# Patient Record
Sex: FEMALE | Race: WHITE | Employment: UNEMPLOYED | URBAN - METROPOLITAN AREA
[De-identification: names, ages, dates, MRNs, and addresses within clinical notes are randomized per-mention and may not be internally consistent; named-entity substitution may affect disease eponyms.]

---

## 2019-02-12 ENCOUNTER — HOSPITAL ENCOUNTER (OUTPATIENT)
Dept: MAMMOGRAPHY | Age: 44
Discharge: HOME | End: 2019-02-12
Payer: COMMERCIAL

## 2019-02-12 DIAGNOSIS — Z12.31 ENCOUNTER FOR SCREENING MAMMOGRAM FOR BREAST CANCER: ICD-10-CM

## 2019-02-12 PROCEDURE — 77063 BREAST TOMOSYNTHESIS BI: CPT

## 2019-05-28 ENCOUNTER — HOSPITAL ENCOUNTER (EMERGENCY)
Age: 44
Discharge: HOME | End: 2019-05-28
Attending: EMERGENCY MEDICINE
Payer: COMMERCIAL

## 2019-05-28 ENCOUNTER — APPOINTMENT (OUTPATIENT)
Dept: GENERAL RADIOLOGY | Age: 44
End: 2019-05-28
Payer: COMMERCIAL

## 2019-05-28 VITALS
RESPIRATION RATE: 18 BRPM | HEIGHT: 66 IN | OXYGEN SATURATION: 100 % | TEMPERATURE: 99.1 F | BODY MASS INDEX: 40.18 KG/M2 | HEART RATE: 78 BPM | WEIGHT: 250 LBS | DIASTOLIC BLOOD PRESSURE: 74 MMHG | SYSTOLIC BLOOD PRESSURE: 116 MMHG

## 2019-05-28 DIAGNOSIS — R06.02 SHORTNESS OF BREATH: Primary | ICD-10-CM

## 2019-05-28 LAB
A/G RATIO: 1.2 (ref 1.1–2.2)
ALBUMIN SERPL-MCNC: 3.7 G/DL (ref 3.4–5)
ALP BLD-CCNC: 83 U/L (ref 40–129)
ALT SERPL-CCNC: 14 U/L (ref 10–40)
ANION GAP SERPL CALCULATED.3IONS-SCNC: 11 MMOL/L (ref 3–16)
AST SERPL-CCNC: 23 U/L (ref 15–37)
BASOPHILS ABSOLUTE: 0 K/UL (ref 0–0.2)
BASOPHILS RELATIVE PERCENT: 0.5 %
BILIRUB SERPL-MCNC: 0.3 MG/DL (ref 0–1)
BILIRUBIN URINE: NEGATIVE
BLOOD, URINE: NEGATIVE
BUN BLDV-MCNC: 20 MG/DL (ref 7–20)
CALCIUM SERPL-MCNC: 8.8 MG/DL (ref 8.3–10.6)
CHLORIDE BLD-SCNC: 104 MMOL/L (ref 99–110)
CLARITY: CLEAR
CO2: 23 MMOL/L (ref 21–32)
COLOR: YELLOW
CREAT SERPL-MCNC: 0.6 MG/DL (ref 0.6–1.1)
D DIMER: <200 NG/ML DDU (ref 0–229)
EOSINOPHILS ABSOLUTE: 0.2 K/UL (ref 0–0.6)
EOSINOPHILS RELATIVE PERCENT: 2.6 %
GFR AFRICAN AMERICAN: >60
GFR NON-AFRICAN AMERICAN: >60
GLOBULIN: 3 G/DL
GLUCOSE BLD-MCNC: 98 MG/DL (ref 70–99)
GLUCOSE URINE: NEGATIVE MG/DL
HCT VFR BLD CALC: 43.4 % (ref 36–48)
HEMOGLOBIN: 14.5 G/DL (ref 12–16)
KETONES, URINE: NEGATIVE MG/DL
LEUKOCYTE ESTERASE, URINE: NEGATIVE
LYMPHOCYTES ABSOLUTE: 2.8 K/UL (ref 1–5.1)
LYMPHOCYTES RELATIVE PERCENT: 32.1 %
MCH RBC QN AUTO: 31.5 PG (ref 26–34)
MCHC RBC AUTO-ENTMCNC: 33.4 G/DL (ref 31–36)
MCV RBC AUTO: 94.2 FL (ref 80–100)
MICROSCOPIC EXAMINATION: NORMAL
MONOCYTES ABSOLUTE: 0.7 K/UL (ref 0–1.3)
MONOCYTES RELATIVE PERCENT: 8.4 %
NEUTROPHILS ABSOLUTE: 5 K/UL (ref 1.7–7.7)
NEUTROPHILS RELATIVE PERCENT: 56.4 %
NITRITE, URINE: NEGATIVE
PDW BLD-RTO: 13.1 % (ref 12.4–15.4)
PH UA: 7.5 (ref 5–8)
PLATELET # BLD: 310 K/UL (ref 135–450)
PMV BLD AUTO: 7.7 FL (ref 5–10.5)
POTASSIUM REFLEX MAGNESIUM: 4.8 MMOL/L (ref 3.5–5.1)
PROTEIN UA: NEGATIVE MG/DL
RBC # BLD: 4.61 M/UL (ref 4–5.2)
SODIUM BLD-SCNC: 138 MMOL/L (ref 136–145)
SPECIFIC GRAVITY UA: 1.01 (ref 1–1.03)
TOTAL PROTEIN: 6.7 G/DL (ref 6.4–8.2)
TROPONIN: <0.01 NG/ML
URINE TYPE: NORMAL
UROBILINOGEN, URINE: 1 E.U./DL
WBC # BLD: 8.8 K/UL (ref 4–11)

## 2019-05-28 PROCEDURE — 85379 FIBRIN DEGRADATION QUANT: CPT

## 2019-05-28 PROCEDURE — 84484 ASSAY OF TROPONIN QUANT: CPT

## 2019-05-28 PROCEDURE — 81003 URINALYSIS AUTO W/O SCOPE: CPT

## 2019-05-28 PROCEDURE — 85025 COMPLETE CBC W/AUTO DIFF WBC: CPT

## 2019-05-28 PROCEDURE — 80053 COMPREHEN METABOLIC PANEL: CPT

## 2019-05-28 PROCEDURE — 99285 EMERGENCY DEPT VISIT HI MDM: CPT

## 2019-05-28 PROCEDURE — 71046 X-RAY EXAM CHEST 2 VIEWS: CPT

## 2019-05-28 PROCEDURE — 93005 ELECTROCARDIOGRAM TRACING: CPT | Performed by: PHYSICIAN ASSISTANT

## 2019-05-28 ASSESSMENT — ENCOUNTER SYMPTOMS
SHORTNESS OF BREATH: 1
GASTROINTESTINAL NEGATIVE: 1

## 2019-05-29 LAB
EKG ATRIAL RATE: 68 BPM
EKG DIAGNOSIS: NORMAL
EKG P AXIS: 13 DEGREES
EKG P-R INTERVAL: 126 MS
EKG Q-T INTERVAL: 388 MS
EKG QRS DURATION: 78 MS
EKG QTC CALCULATION (BAZETT): 412 MS
EKG R AXIS: 26 DEGREES
EKG T AXIS: 10 DEGREES
EKG VENTRICULAR RATE: 68 BPM

## 2019-05-29 PROCEDURE — 93010 ELECTROCARDIOGRAM REPORT: CPT | Performed by: INTERNAL MEDICINE

## 2019-05-29 NOTE — ED NOTES
Discharge instructions reviewed. Patient expressed understanding.  IV removed from right hand, tip intact, gauze pressure dressing applied     Alina Daniels RN  05/28/19 0851

## 2019-05-29 NOTE — ED NOTES
Patient ambulated approx 100 feet. spo2 remained 96% throughtout ambulation.  Patient on RA during ambulation     John Smith RN  05/28/19 7110

## 2019-05-29 NOTE — ED PROVIDER NOTES
**[facility]  EMERGENCY [facility]PARTMENT ENCOUNTER      Pt Name: Liam Rizo  QNY:[id number]  [place] 1975  Date of evaluation: 5/28/2019  Provider: Radhika Roy PA-C    Patient seen and evaluated by Dr. Yosef De Luna. Chief Complaint:    Chief Complaint   Patient presents with    Shortness of Breath     patient states that she has been having shortness of breath for sometime and today she felt like she was going to pass out       Nursing Notes, Past Medical Hx, Past Surgical Hx, Social Hx, Allergies, and Family Hx were all reviewed and agreed with or any disagreements were addressed in the HPI.    HPI:  (Location, Duration, Timing, Severity,Quality, Assoc Sx, Context, Modifying factors)  This is a  39 y.o. female brought in for evaluation of shortness of breath. Patient states \"she can't get enough air\". Onset was about yesterday. Duration of symptoms have been persistent since onset. She also admits to feeling very dizzy but denies passing out. No aggravating or alleviating symptoms. She hasn't tried anything at home for her symptoms at this time. She denies any other complaints. Denies any chest pain.     PastMedical/Surgical History:      Diagnosis Date    Depression     Influenza B 03/30/2017    Interstitial cystitis     Irregular uterine bleeding     Skull fracture (HCC)     as infant    Sleep apnea          Procedure Laterality Date    CHOLECYSTECTOMY      DILATION AND CURETTAGE OF UTERUS      ECTOPIC PREGNANCY SURGERY      x 2    ENDOMETRIAL ABLATION      GASTRIC BYPASS SURGERY  2005,2008    X 2    HYSTEROSCOPY  9/3/15    D&C, Thermal Ablation    WISDOM TOOTH EXTRACTION      WRIST FRACTURE SURGERY Right 10/22/2014    with [facility]       Medications:  Current Discharge Medication List      CONTINUE these medications which have NOT CHANGED    Details   escitalopram (LEXAPRO) 10 MG tablet Take 10 mg by mouth daily calcium carbonate (TUMS) 500 MG chewable tablet Take 1 tablet by mouth daily      naproxen (NAPROSYN) 500 MG tablet Take 1 tablet by mouth 2 times daily (with meals)  Qty: 30 tablet, Refills: 0      oxybutynin (DITROPAN) 5 MG tablet Take 5 mg by mouth 3 times daily      BIOTIN PO Take  by mouth daily. ferrous sulfate 325 (65 FE) MG tablet Take 325 mg by mouth daily (with breakfast) Every other day      calcium citrate (CALCITRATE) 950 MG tablet Take 1 tablet by mouth daily. Multiple Vitamins-Minerals (THERAPEUTIC MULTIVITAMIN-MINERALS) tablet Take 1 tablet by mouth daily. ibuprofen (IBU) 600 MG tablet Take 1 tablet by mouth every 6 hours as needed for Pain for 20 doses. Qty: 20 tablet, Refills: 0      Probiotic Product (PROBIOTIC & ACIDOPHILUS EX ST PO) Take  by mouth. Vitamin D (CHOLECALCIFEROL) 1000 UNITS CAPS capsule Take 10,000 Units by mouth daily. 5 days of each week      vitamin B-12 (CYANOCOBALAMIN) 100 MCG tablet Take 50 mcg by mouth daily 5 days a week      FLUoxetine (PROZAC) 40 MG capsule Take 60 mg by mouth daily. Loratadine (CLARITIN) 10 MG CAPS Take  by mouth. Review of Systems:  Review of Systems   Constitutional: Negative. Respiratory: Positive for shortness of breath. Cardiovascular: Negative. Gastrointestinal: Negative. Genitourinary: Negative. Musculoskeletal: Negative. Skin: Negative. Neurological: Positive for dizziness. Positives and Pertinent negatives as per HPI. Except as noted above in the ROS, problem specific ROS was completed and is negative. Physical Exam:  Physical Exam   Constitutional: She is oriented to person, place, and time. Vital signs are normal. She appears well-developed and well-nourished. Non-toxic appearance. She does not have a sickly appearance. She does not appear ill. No distress. HENT:   Head: Normocephalic and atraumatic. Nose: Nose normal.   Eyes: Right eye exhibits no discharge. Left eye exhibits no discharge. Neck: Normal range of motion. Neck supple. Cardiovascular: Normal rate, regular rhythm and normal heart sounds. Exam reveals no gallop. No murmur heard. Pulmonary/Chest: Effort normal and breath sounds normal. No stridor. No respiratory distress. She has no decreased breath sounds. She has no wheezes. She has no rhonchi. She has no rales. She exhibits no tenderness. Musculoskeletal: Normal range of motion. She exhibits no deformity. Neurological: She is alert and oriented to person, place, and time. Skin: Skin is warm and dry. She is not diaphoretic. No lower unilateral leg swelling. Psychiatric: She has a normal mood and affect. Her behavior is normal.   Nursing note and vitals reviewed.       MEDICAL DECISION MAKING    Vitals:    Vitals:    05/28/19 1922   BP: (!) 141/57   Pulse: 81   Resp: 16   Temp: 99.1 °F (37.3 °C)   TempSrc: Oral   SpO2: 100%   Weight: 250 lb (113.4 kg)   Height: 5' 6\" (1.676 m)       LABS:  Labs Reviewed   CBC WITH AUTO DIFFERENTIAL    Narrative:     Performed at:  [facility] [address],  [place], [facility]   Phone [phone]   COMPREHENSIVE METABOLIC PANEL W/ REFLEX TO MG FOR LOW K    Narrative:     Performed at:  [place] [address],  [place], [facility]   Phone [phone]   TROPONIN    Narrative:     Performed at:  [place] [address],  [place], [facility]   Phone [phone]   D-DIMER, QUANTITATIVE    Narrative:     Performed at:  [place] [address],  [place], [facility]   Phone [phone]   URINALYSIS    Narrative:     Performed at:  [facility]) - [facility] [address],  [place], [place]   Phone [phone] of labs reviewed and werenegative at this time or not returned at the time of this note.    RADIOLOGY:   Non-plain film images such as CT, Ultrasound and MRI are read by the radiologist. Solon Closs, PA-C have directly visualized the radiologic plain film image(s) with the below findings:        Interpretation per the Radiologist below, if available at the time of thisnote:    XR CHEST STANDARD (2 VW)   Final Result   No acute disease              Xr Chest Standard (2 Vw)    Result Date: 5/28/2019  EXAMINATION: TWO XRAY VIEWS OF THE CHEST 5/28/2019 7:55 pm COMPARISON: 03/30/2017 HISTORY: ORDERING SYSTEM PROVIDED HISTORY: SOB TECHNOLOGIST PROVIDED HISTORY: Reason for exam:->SOB Ordering Physician Provided Reason for Exam: sob, chest pain/pressure upon breathing that starts in the anterior medial portion of chest that radiates back to the back interment Acuity: Acute Type of Exam: Initial FINDINGS: Lung volumes are low accentuating heart size and bronchovascular markings at the lung bases. Patient body habitus limits evaluation of fine pulmonary parenchymal changes. Heart size is normal.  Mediastinal contours are normal. Pulmonary vascularity is normal.  No focal lung consolidation is noted. No significant fluid is seen. Prominent pericardial fat pad seen right. No acute disease         MEDICAL DECISION MAKING / ED COURSE:      PROCEDURES:   Procedures    None    Patient was given:  Medications - No data to display    Patient brought in for evaluation of shortness of breath with associated dizziness. On exam she is alert oriented afebrile hemodynamically stable breathing comfortably and room air satting at 100%. Appears nontoxic no respiratory distress. Patient is regular rate and rhythm. EKG reviewed by my attending please see her note for dictation. Troponin is negative. D-dimer was negative chest x-ray was negative. Blood work otherwise unremarkable. Plan will be to ambulate patient. She ambulated without difficulty. She did maintain her oxygen saturation.   Patient seen in conjunction with my attending who agrees assessment and plan. Patient told to follow-up with her primary care in the next 1-3 days with any new or worsening symptoms. Patient was comfortable prior to discharge.     Results for orders placed or performed during the hospital encounter of 05/28/19   CBC auto differential   Result Value Ref Range    WBC 8.8 4.0 - 11.0 K/uL    RBC 4.61 4.00 - 5.20 M/uL    Hemoglobin 14.5 12.0 - 16.0 g/dL    Hematocrit 43.4 36.0 - 48.0 %    MCV 94.2 80.0 - 100.0 fL    MCH 31.5 26.0 - 34.0 pg    MCHC 33.4 31.0 - 36.0 g/dL    RDW 13.1 12.4 - 15.4 %    Platelets 893 229 - 938 K/uL    MPV 7.7 5.0 - 10.5 fL    Neutrophils % 56.4 %    Lymphocytes % 32.1 %    Monocytes % 8.4 %    Eosinophils % 2.6 %    Basophils % 0.5 %    Neutrophils # 5.0 1.7 - 7.7 K/uL    Lymphocytes # 2.8 1.0 - 5.1 K/uL    Monocytes # 0.7 0.0 - 1.3 K/uL    Eosinophils # 0.2 0.0 - 0.6 K/uL    Basophils # 0.0 0.0 - 0.2 K/uL   Comprehensive Metabolic Panel w/ Reflex to MG   Result Value Ref Range    Sodium 138 136 - 145 mmol/L    Potassium reflex Magnesium 4.8 3.5 - 5.1 mmol/L    Chloride 104 99 - 110 mmol/L    CO2 23 21 - 32 mmol/L    Anion Gap 11 3 - 16    Glucose 98 70 - 99 mg/dL    BUN 20 7 - 20 mg/dL    CREATININE 0.6 0.6 - 1.1 mg/dL    GFR Non-African American >60 >60    GFR African American >60 >60    Calcium 8.8 8.3 - 10.6 mg/dL    Total Protein 6.7 6.4 - 8.2 g/dL    Alb 3.7 3.4 - 5.0 g/dL    Albumin/Globulin Ratio 1.2 1.1 - 2.2    Total Bilirubin 0.3 0.0 - 1.0 mg/dL    Alkaline Phosphatase 83 40 - 129 U/L    ALT 14 10 - 40 U/L    AST 23 15 - 37 U/L    Globulin 3.0 g/dL   Troponin   Result Value Ref Range    Troponin <0.01 <0.01 ng/mL   D-Dimer, Quantitative   Result Value Ref Range    D-Dimer, Quant <200 0 - 229 ng/mL DDU   Urinalysis   Result Value Ref Range    Color, UA Yellow Straw/Yellow    Clarity, UA Clear Clear    Glucose, Ur Negative Negative mg/dL    Bilirubin Urine Negative Negative    Ketones, Urine Negative Negative mg/dL    Specific Gravity, UA 1.015 1.005 - 1.030    Blood, Urine Negative Negative    pH, UA 7.5 5.0 - 8.0    Protein, UA Negative Negative mg/dL    Urobilinogen, Urine 1.0 <2.0 E.U./dL    Nitrite, Urine Negative Negative    Leukocyte Esterase, Urine Negative Negative    Microscopic Examination Not Indicated     Urine Type Not Specified    EKG 12 Lead   Result Value Ref Range    Ventricular Rate 68 BPM    Atrial Rate 68 BPM    P-R Interval 126 ms    QRS Duration 78 ms    Q-T Interval 388 ms    QTc Calculation (Bazett) 412 ms    P Axis 13 degrees    R Axis 26 degrees    T Axis 10 degrees    Diagnosis       Normal sinus rhythmNormal ECGNo previous ECGs available       I estimate there is LOW risk for PULMONARY EMBOLISM, ACUTE CORONARY SYNDROME, OR THORACIC AORTIC DISSECTION, thus I consider the discharge disposition reasonable. Car French and I have discussed the diagnosis and risks, and we agree with discharging home to follow-up with their primary doctor. We also discussed returning to the Emergency Department immediately if new or worsening symptoms occur. We have discussed the symptoms which are most concerning (e.g., bloody sputum, fever, worsening pain or shortness of breath, vomiting) that necessitate immediate return. FINAL Impression    1. Shortness of breath        Blood pressure (!) 141/57, pulse 81, temperature 99.1 °F (37.3 °C), temperature source Oral, resp. rate 16, height 5' 6\" (1.676 m), weight 250 lb (113.4 kg), SpO2 100 %, not currently breastfeeding. The patient tolerated their visit well. I evaluated the patient. The physician was available for consultation as needed. The patient and / or the family were informed of the results of anytests, a time was given to answer questions, a plan was proposed and they agreed with plan. CLINICAL IMPRESSION:  1.  Shortness of breath        DISPOSITION        PATIENT REFERRED TO:  Raphael Jacobs MD  [address] [place] [address],[address] [facility]  [phone]    Schedule an appointment as soon as possible for a visit in 3 days        DISCHARGE MEDICATIONS:  Current Discharge Medication List          DISCONTINUED MEDICATIONS:  Current Discharge Medication List                 (Please note the MDM and HPI sections of this note were completed with a voice recognition program.  Efforts weremade to edit the dictations but occasionally words are mis-transcribed.)    Electronically signed, Carlos Daugherty PA-C,          Carlos Daugherty PA-C  05/28/19 8571

## 2019-05-29 NOTE — ED PROVIDER NOTES
I independently performed a [facility] and [facility]. All diagnostic, treatment, and disposition decisions were made by myself in conjunction with the advanced practice provider.     -Kim Welsh is a 39 y.o. female with a history of . Patient states she's breathing fine, but feels like 'I can't get enough air' (constant) and  'cold/tingling' sensation to left arm (intermittent) and x 2 days. Also reports fatigue x several months. Denies chest pain. Currently no numbness/tingling to left arm. -PE: well appearing, speaking in full sentences, not in acute distress, RRR, CTAB/l, no w/r/r, no focal neurological deficit.   -lab workup wnl  <200 d dimer  -cxr with no acute process  -The Ekg interpreted by me shows  normal sinus rhythm with a rate of 68  Axis is   Normal  QTc is  412  Intervals and Durations are unremarkable. ST Segments: normal  No prior ekg  -Cx-ray shows no acute disease  -ambulated patient who maintained oxygen saturation of 96%  -No acute pathology was noted and plan for discharge home with close follow up with PCP was discussed with patient. Strict ED return precautions given for new/worsending symptoms. Patient  in agreement with plan, verbally confirm understanding and have no further questions/concerns. For further details of Kim Welsh emergency department encounter, please see GUADALUPE Hernandez 's documentation.         Nayan Kong MD  05/29/19 0125

## 2019-10-11 ENCOUNTER — HOSPITAL ENCOUNTER (EMERGENCY)
Age: 44
Discharge: HOME | End: 2019-10-11
Attending: EMERGENCY MEDICINE
Payer: COMMERCIAL

## 2019-10-11 ENCOUNTER — APPOINTMENT (OUTPATIENT)
Dept: CT IMAGING | Age: 44
End: 2019-10-11
Payer: COMMERCIAL

## 2019-10-11 VITALS
SYSTOLIC BLOOD PRESSURE: 121 MMHG | WEIGHT: 267 LBS | BODY MASS INDEX: 42.91 KG/M2 | OXYGEN SATURATION: 100 % | DIASTOLIC BLOOD PRESSURE: 74 MMHG | RESPIRATION RATE: 14 BRPM | HEIGHT: 66 IN | TEMPERATURE: 98.2 F | HEART RATE: 58 BPM

## 2019-10-11 DIAGNOSIS — M54.2 NECK PAIN: Primary | ICD-10-CM

## 2019-10-11 LAB
A/G RATIO: 1.4 (ref 1.1–2.2)
ALBUMIN SERPL-MCNC: 4 G/DL (ref 3.4–5)
ALP BLD-CCNC: 77 U/L (ref 40–129)
ALT SERPL-CCNC: 15 U/L (ref 10–40)
ANION GAP SERPL CALCULATED.3IONS-SCNC: 10 MMOL/L (ref 3–16)
AST SERPL-CCNC: 22 U/L (ref 15–37)
BASOPHILS ABSOLUTE: 0 K/UL (ref 0–0.2)
BASOPHILS RELATIVE PERCENT: 0.4 %
BILIRUB SERPL-MCNC: 0.3 MG/DL (ref 0–1)
BUN BLDV-MCNC: 21 MG/DL (ref 7–20)
CALCIUM SERPL-MCNC: 9.2 MG/DL (ref 8.3–10.6)
CHLORIDE BLD-SCNC: 101 MMOL/L (ref 99–110)
CO2: 26 MMOL/L (ref 21–32)
CREAT SERPL-MCNC: 0.7 MG/DL (ref 0.6–1.1)
EOSINOPHILS ABSOLUTE: 0.2 K/UL (ref 0–0.6)
EOSINOPHILS RELATIVE PERCENT: 2.3 %
GFR AFRICAN AMERICAN: >60
GFR NON-AFRICAN AMERICAN: >60
GLOBULIN: 2.8 G/DL
GLUCOSE BLD-MCNC: 93 MG/DL (ref 70–99)
HCT VFR BLD CALC: 42.2 % (ref 36–48)
HEMOGLOBIN: 13.8 G/DL (ref 12–16)
LYMPHOCYTES ABSOLUTE: 3.4 K/UL (ref 1–5.1)
LYMPHOCYTES RELATIVE PERCENT: 37.2 %
MCH RBC QN AUTO: 30.8 PG (ref 26–34)
MCHC RBC AUTO-ENTMCNC: 32.8 G/DL (ref 31–36)
MCV RBC AUTO: 94 FL (ref 80–100)
MONOCYTES ABSOLUTE: 0.6 K/UL (ref 0–1.3)
MONOCYTES RELATIVE PERCENT: 6.6 %
NEUTROPHILS ABSOLUTE: 5 K/UL (ref 1.7–7.7)
NEUTROPHILS RELATIVE PERCENT: 53.5 %
PDW BLD-RTO: 13.3 % (ref 12.4–15.4)
PLATELET # BLD: 352 K/UL (ref 135–450)
PMV BLD AUTO: 7.4 FL (ref 5–10.5)
POTASSIUM REFLEX MAGNESIUM: 3.9 MMOL/L (ref 3.5–5.1)
RBC # BLD: 4.49 M/UL (ref 4–5.2)
SODIUM BLD-SCNC: 137 MMOL/L (ref 136–145)
TOTAL PROTEIN: 6.8 G/DL (ref 6.4–8.2)
WBC # BLD: 9.3 K/UL (ref 4–11)

## 2019-10-11 PROCEDURE — 85025 COMPLETE CBC W/AUTO DIFF WBC: CPT

## 2019-10-11 PROCEDURE — 70498 CT ANGIOGRAPHY NECK: CPT

## 2019-10-11 PROCEDURE — 6370000000 HC RX 637 (ALT 250 FOR IP): Performed by: EMERGENCY MEDICINE

## 2019-10-11 PROCEDURE — 93005 ELECTROCARDIOGRAM TRACING: CPT | Performed by: EMERGENCY MEDICINE

## 2019-10-11 PROCEDURE — 6360000002 HC RX W HCPCS: Performed by: EMERGENCY MEDICINE

## 2019-10-11 PROCEDURE — 96374 THER/PROPH/DIAG INJ IV PUSH: CPT

## 2019-10-11 PROCEDURE — 6360000004 HC RX CONTRAST MEDICATION: Performed by: EMERGENCY MEDICINE

## 2019-10-11 PROCEDURE — 80053 COMPREHEN METABOLIC PANEL: CPT

## 2019-10-11 PROCEDURE — 99284 EMERGENCY DEPT VISIT MOD MDM: CPT

## 2019-10-11 RX ORDER — METHOCARBAMOL 500 MG/1
500 TABLET, FILM COATED ORAL 4 TIMES DAILY PRN
Qty: 20 TABLET | Refills: 0 | Status: SHIPPED | OUTPATIENT
Start: 2019-10-11 | End: 2020-10-05

## 2019-10-11 RX ORDER — LIDOCAINE 4 G/G
1 PATCH TOPICAL ONCE
Status: DISCONTINUED | OUTPATIENT
Start: 2019-10-11 | End: 2019-10-12 | Stop reason: HOSPADM

## 2019-10-11 RX ORDER — KETOROLAC TROMETHAMINE 30 MG/ML
30 INJECTION, SOLUTION INTRAMUSCULAR; INTRAVENOUS ONCE
Status: COMPLETED | OUTPATIENT
Start: 2019-10-11 | End: 2019-10-11

## 2019-10-11 RX ADMIN — IOPAMIDOL 75 ML: 755 INJECTION, SOLUTION INTRAVENOUS at 21:56

## 2019-10-11 RX ADMIN — KETOROLAC TROMETHAMINE 30 MG: 30 INJECTION, SOLUTION INTRAMUSCULAR at 21:10

## 2019-10-11 ASSESSMENT — PAIN SCALES - GENERAL
PAINLEVEL_OUTOF10: 7
PAINLEVEL_OUTOF10: 9

## 2019-10-12 LAB
EKG ATRIAL RATE: 61 BPM
EKG DIAGNOSIS: NORMAL
EKG P AXIS: 33 DEGREES
EKG P-R INTERVAL: 140 MS
EKG Q-T INTERVAL: 426 MS
EKG QRS DURATION: 84 MS
EKG QTC CALCULATION (BAZETT): 428 MS
EKG R AXIS: 48 DEGREES
EKG T AXIS: 38 DEGREES
EKG VENTRICULAR RATE: 61 BPM

## 2019-10-12 PROCEDURE — 93010 ELECTROCARDIOGRAM REPORT: CPT | Performed by: INTERNAL MEDICINE

## 2020-10-01 ENCOUNTER — HOSPITAL ENCOUNTER (EMERGENCY)
Age: 45
Discharge: HOME | End: 2020-10-01
Attending: EMERGENCY MEDICINE
Payer: COMMERCIAL

## 2020-10-01 VITALS
DIASTOLIC BLOOD PRESSURE: 62 MMHG | HEIGHT: 66 IN | WEIGHT: 220 LBS | SYSTOLIC BLOOD PRESSURE: 102 MMHG | TEMPERATURE: 97.5 F | RESPIRATION RATE: 14 BRPM | BODY MASS INDEX: 35.36 KG/M2 | HEART RATE: 70 BPM | OXYGEN SATURATION: 100 %

## 2020-10-01 LAB
A/G RATIO: 1.8 (ref 1.1–2.2)
ACETAMINOPHEN LEVEL: <5 UG/ML (ref 10–30)
ALBUMIN SERPL-MCNC: 4.1 G/DL (ref 3.4–5)
ALP BLD-CCNC: 58 U/L (ref 40–129)
ALT SERPL-CCNC: 6 U/L (ref 10–40)
AMPHETAMINE SCREEN, URINE: ABNORMAL
ANION GAP SERPL CALCULATED.3IONS-SCNC: 7 MMOL/L (ref 3–16)
AST SERPL-CCNC: 13 U/L (ref 15–37)
BARBITURATE SCREEN URINE: ABNORMAL
BASOPHILS ABSOLUTE: 0 K/UL (ref 0–0.2)
BASOPHILS RELATIVE PERCENT: 0.4 %
BENZODIAZEPINE SCREEN, URINE: ABNORMAL
BILIRUB SERPL-MCNC: 0.6 MG/DL (ref 0–1)
BUN BLDV-MCNC: 16 MG/DL (ref 7–20)
CALCIUM SERPL-MCNC: 9.1 MG/DL (ref 8.3–10.6)
CANNABINOID SCREEN URINE: POSITIVE
CHLORIDE BLD-SCNC: 104 MMOL/L (ref 99–110)
CO2: 28 MMOL/L (ref 21–32)
COCAINE METABOLITE SCREEN URINE: ABNORMAL
CREAT SERPL-MCNC: <0.5 MG/DL (ref 0.6–1.1)
EOSINOPHILS ABSOLUTE: 0.1 K/UL (ref 0–0.6)
EOSINOPHILS RELATIVE PERCENT: 1.6 %
ETHANOL: NORMAL MG/DL (ref 0–0.08)
GFR AFRICAN AMERICAN: >60
GFR NON-AFRICAN AMERICAN: >60
GLOBULIN: 2.3 G/DL
GLUCOSE BLD-MCNC: 105 MG/DL (ref 70–99)
HCT VFR BLD CALC: 41.1 % (ref 36–48)
HEMOGLOBIN: 13.6 G/DL (ref 12–16)
LYMPHOCYTES ABSOLUTE: 2.1 K/UL (ref 1–5.1)
LYMPHOCYTES RELATIVE PERCENT: 25.3 %
Lab: ABNORMAL
MCH RBC QN AUTO: 31.5 PG (ref 26–34)
MCHC RBC AUTO-ENTMCNC: 33 G/DL (ref 31–36)
MCV RBC AUTO: 95.6 FL (ref 80–100)
METHADONE SCREEN, URINE: ABNORMAL
MONOCYTES ABSOLUTE: 0.6 K/UL (ref 0–1.3)
MONOCYTES RELATIVE PERCENT: 7.1 %
NEUTROPHILS ABSOLUTE: 5.4 K/UL (ref 1.7–7.7)
NEUTROPHILS RELATIVE PERCENT: 65.6 %
OPIATE SCREEN URINE: ABNORMAL
OXYCODONE URINE: ABNORMAL
PDW BLD-RTO: 13.5 % (ref 12.4–15.4)
PH UA: 6
PHENCYCLIDINE SCREEN URINE: ABNORMAL
PLATELET # BLD: 313 K/UL (ref 135–450)
PMV BLD AUTO: 7.4 FL (ref 5–10.5)
POTASSIUM REFLEX MAGNESIUM: 3.9 MMOL/L (ref 3.5–5.1)
PROPOXYPHENE SCREEN: ABNORMAL
RBC # BLD: 4.3 M/UL (ref 4–5.2)
SALICYLATE, SERUM: 2.1 MG/DL (ref 15–30)
SODIUM BLD-SCNC: 139 MMOL/L (ref 136–145)
TOTAL PROTEIN: 6.4 G/DL (ref 6.4–8.2)
WBC # BLD: 8.3 K/UL (ref 4–11)

## 2020-10-01 PROCEDURE — 99283 EMERGENCY DEPT VISIT LOW MDM: CPT

## 2020-10-01 PROCEDURE — G0480 DRUG TEST DEF 1-7 CLASSES: HCPCS

## 2020-10-01 PROCEDURE — 85025 COMPLETE CBC W/AUTO DIFF WBC: CPT

## 2020-10-01 PROCEDURE — 80307 DRUG TEST PRSMV CHEM ANLYZR: CPT

## 2020-10-01 PROCEDURE — 80053 COMPREHEN METABOLIC PANEL: CPT

## 2020-10-01 PROCEDURE — 6370000000 HC RX 637 (ALT 250 FOR IP): Performed by: EMERGENCY MEDICINE

## 2020-10-01 RX ORDER — ACETAMINOPHEN 160 MG/5ML
650 SOLUTION ORAL ONCE
Status: COMPLETED | OUTPATIENT
Start: 2020-10-01 | End: 2020-10-01

## 2020-10-01 RX ADMIN — ACETAMINOPHEN 650 MG: 650 SOLUTION ORAL at 16:31

## 2020-10-01 RX ADMIN — IBUPROFEN 400 MG: 100 SUSPENSION ORAL at 16:31

## 2020-10-01 ASSESSMENT — PATIENT HEALTH QUESTIONNAIRE - PHQ9: SUM OF ALL RESPONSES TO PHQ QUESTIONS 1-9: 17

## 2020-10-01 ASSESSMENT — PAIN SCALES - GENERAL: PAINLEVEL_OUTOF10: 7

## 2020-10-01 NOTE — ED NOTES
Discharge instructions reviewed and belongings returned. Pt verbalized her understanding and thanked writer as she exited the JOSE. \" You guys have been wonderful. Thank you so much\".       Irene Chase RN  10/01/20 4689

## 2020-10-01 NOTE — DISCHARGE INSTR - COC
Continuity of Care Form    Patient Name: Pola Frost   :  1975  MRN:  [id number]    Admit date:  10/1/2020  Discharge date:  ***    Code Status Order: No Order   Advance Directives:     Admitting Physician:  No admitting provider for patient encounter. PCP: Opal Dodson MD    Discharging Nurse: [facility] Unit/Room#: Oralia Anders Unit Phone Number: ***    Emergency Contact:   Extended Emergency Contact Information  Primary Emergency Contact: Lucio Landa  Address: [place] [address] [address]           [facility], [address] Phone: [phone]  Relation: Spouse  Secondary Emergency Contact: Ana Rosa Wagoner  Address: [address] [facility], [address] Phone: [phone]  Relation: Parent    Past Surgical History:  Past Surgical History:   Procedure Laterality Date    CHOLECYSTECTOMY      DILATION AND CURETTAGE OF UTERUS      ECTOPIC PREGNANCY SURGERY      x 2    ENDOMETRIAL ABLATION      GASTRIC BYPASS SURGERY  ,2008    X 2    HYSTEROSCOPY  9/3/15    D&C, Thermal Ablation    WISDOM TOOTH EXTRACTION      WRIST FRACTURE SURGERY Right 10/22/2014    with [facility]       Immunization History: There is no immunization history on file for this patient.     Active Problems:  Patient Active Problem List   Diagnosis Code    Wrist pain M25.539    Abnormal uterine bleeding (AUB) N93.9       Isolation/Infection:   Isolation          No Isolation        Patient Infection Status     None to display          Nurse Assessment:  Last Vital Signs: /73   Pulse 61   Temp 98 °F (36.7 °C) (Oral)   Resp 18   Ht 5' 6\" (1.676 m)   Wt 220 lb (99.8 kg)   SpO2 98%   BMI 35.51 kg/m²     Last documented pain score (0-10 scale): Pain Level: 7  Last Weight:   Wt Readings from Last 1 Encounters:   10/01/20 220 lb (99.8 kg)     Mental Status:  {IP PT MENTAL STATUS:}    IV Access:  8 Paola Eddie JAMEL IV ABQVOQ:352396156}    Nursing Mobility/ADLs:  Walking   {CHP DME ZQTM:224281239}  Transfer  {CHP DME WDKE:384083786}  Bathing  {CHP DME UWED:836573953}  Dressing  {CHP DME DCMM:499003350}  Toileting  {CHP DME WCAY:059511518}  Feeding  {CHP DME PQGU:691916498}  Med Admin  {P DME CFX}  Med Delivery   {[facility] MED Delivery:289739740}    Wound Care Documentation and Therapy:        Elimination:  Continence:   · Bowel: {YES / BL:48142}  · Bladder: {YES / RR:54366}  Urinary Catheter: {Urinary Catheter:525728625}   Colostomy/Ileostomy/Ileal Conduit: {YES / PY:18516}       Date of Last BM: ***  No intake or output data in the 24 hours ending 10/01/20 1653  No intake/output data recorded.     Safety Concerns:     508 [employer] Safety Concerns:130468487}    Impairments/Disabilities:      508 StatSocial Impairments/Disabilities:541641048}    Nutrition Therapy:  Current Nutrition Therapy:   508 [employer] Diet List:130328123}    Routes of Feeding: {[facility] DME Other Feedings:549076845}  Liquids: {Slp liquid thickness:04394}  Daily Fluid Restriction: {CHP DME Yes amt example:453957111}  Last Modified Barium Swallow with Video (Video Swallowing Test): {Done Not Done CCU}    Treatments at the Time of Hospital Discharge:   Respiratory Treatments: ***  Oxygen Therapy:  {Therapy; copd oxygen:66958}  Ventilator:    { CC Vent KKQB:154430736}    Rehab Therapies: {THERAPEUTIC INTERVENTION:3068840112}  Weight Bearing Status/Restrictions: 508 CRAVE Weight Bearin}  Other Medical Equipment (for information only, NOT a DME order):  {EQUIPMENT:781349695}  Other Treatments: ***    Patient's personal belongings (please select all that are sent with patient):  {[facility] DME Belongings:774009704}    RN SIGNATURE:  {Esignature:164377491}    CASE MANAGEMENT/SOCIAL WORK SECTION    Inpatient Status Date: ***    Readmission Risk Assessment Score:  Readmission Risk              Risk of Unplanned Readmission:        0           Discharging to Facility/

## 2020-10-01 NOTE — ED PROVIDER NOTES
[address] ED      CHIEF COMPLAINT  Psychiatric Evaluation (needs stress reduction loss in family and work issues)       HISTORY OF PRESENT ILLNESS  Maureen Loyola is a 54 y.o. female  who presents to the ED for evaluation of anxiety and depression. Patient reports having 30 years of anxiety and depression but it has recently been much worse. Reports her grandmother passed away 1 week ago which was a big stressor. Patient had to request time off at her job at [employer] and patient says her employers were not very nice to her during this stressful time. Reports going to [facility] ED and was sent home after a long wait. Denies having SI/HI. Denies having visual or auditory hallucinations. Denies any substance use. Reports having h/o heavy alcohol use but denies any use int he past few months. Denies having any other medical complaints. Denies chest pain, shortness of breath, abdominal pain. No other complaints, modifying factors or associated symptoms. I have reviewed the following from the nursing documentation.     Past Medical History:   Diagnosis Date    Anxiety     Depression     Influenza B 03/30/2017    Interstitial cystitis     Irregular uterine bleeding     Personality disorder (HCC)     PTSD (post-traumatic stress disorder)     Skull fracture (HCC)     as infant    Sleep apnea      Past Surgical History:   Procedure Laterality Date    CHOLECYSTECTOMY      DILATION AND CURETTAGE OF UTERUS      ECTOPIC PREGNANCY SURGERY      x 2    ENDOMETRIAL ABLATION      GASTRIC BYPASS SURGERY  2005,2008    X 2    HYSTEROSCOPY  9/3/15    D&C, Thermal Ablation    WISDOM TOOTH EXTRACTION      WRIST FRACTURE SURGERY Right 10/22/2014    with [facility]     Family History   Problem Relation Age of Onset    Arthritis Mother     Depression Mother     High Blood Pressure Father     Depression Father     Depression Sister     Alcohol Abuse Maternal Grandfather     Alcohol Abuse Paternal Grandfather      Social History     Socioeconomic History    Marital status:      Spouse name: Not on file    Number of children: Not on file    Years of education: Not on file    Highest education level: Not on file   Occupational History    Not on file   Social Needs    Financial resource strain: Not on file    Food insecurity     Worry: Not on file     Inability: Not on file    Transportation needs     Medical: Not on file     Non-medical: Not on file   Tobacco Use    Smoking status: Former Smoker     Packs/day: 0.00     Last attempt to quit: 2012     Years since quittin.3    Smokeless tobacco: Never Used    Tobacco comment: uses nicotine gum   Substance and Sexual Activity    Alcohol use: No     Alcohol/week: 1.7 standard drinks     Types: 2 Standard drinks or equivalent per week     Comment: quit     Drug use: Yes     Types: Marijuana    Sexual activity: Yes     Partners: Male   Lifestyle    Physical activity     Days per week: Not on file     Minutes per session: Not on file    Stress: Not on file   Relationships    Social connections     Talks on phone: Not on file     Gets together: Not on file     Attends [employer] service: Not on file     Active member of club or organization: Not on file     Attends meetings of clubs or organizations: Not on file     Relationship status: Not on file    Intimate partner violence     Fear of current or ex partner: Not on file     Emotionally abused: Not on file     Physically abused: Not on file     Forced sexual activity: Not on file   Other Topics Concern    Not on file   Social History Narrative    Not on file     No current facility-administered medications for this encounter.       Current Outpatient Medications   Medication Sig Dispense Refill    methocarbamol (ROBAXIN) 500 MG tablet Take 1 tablet by mouth 4 times daily as needed (muscle spasms) 20 tablet 0    calcium carbonate (TUMS) 500 MG chewable tablet Take 1 tablet by mouth daily      naproxen (NAPROSYN) 500 MG tablet Take 1 tablet by mouth 2 times daily (with meals) 30 tablet 0    naproxen (NAPROSYN) 500 MG tablet Take 1 tablet by mouth 2 times daily (with meals) for 5 days 10 tablet 0    oxybutynin (DITROPAN) 5 MG tablet Take 5 mg by mouth 3 times daily      BIOTIN PO Take  by mouth daily.  ferrous sulfate 325 (65 FE) MG tablet Take 325 mg by mouth daily (with breakfast) Every other day      calcium citrate (CALCITRATE) 950 MG tablet Take 1 tablet by mouth daily.  Multiple Vitamins-Minerals (THERAPEUTIC MULTIVITAMIN-MINERALS) tablet Take 1 tablet by mouth daily.  ibuprofen (IBU) 600 MG tablet Take 1 tablet by mouth every 6 hours as needed for Pain for 20 doses. 20 tablet 0    Probiotic Product (PROBIOTIC & ACIDOPHILUS EX ST PO) Take  by mouth.  Vitamin D (CHOLECALCIFEROL) 1000 UNITS CAPS capsule Take 10,000 Units by mouth daily. 5 days of each week      vitamin B-12 (CYANOCOBALAMIN) 100 MCG tablet Take 50 mcg by mouth daily 5 days a week      Loratadine (CLARITIN) 10 MG CAPS Take  by mouth. Allergies   Allergen Reactions    Sulfa Antibiotics     Toviaz [Fesoterodine Fumarate Er]     Tape Shahrzad  Tape] Rash     And bandaids - can wear for short time       REVIEW OF SYSTEMS  10 systems reviewed, pertinent positives per HPI otherwise noted to be negative. PHYSICAL EXAM  /62   Pulse 70   Temp 97.5 °F (36.4 °C) (Oral)   Resp 14   Ht 5' 6\" (1.676 m)   Wt 220 lb (99.8 kg)   SpO2 100%   BMI 35.51 kg/m²    GENERAL APPEARANCE: Awake and alert. tearful  HENT: Normocephalic. Atraumatic. CN  2-12 grossly intact  HEART/CHEST: RRR. No murmurs appreciated   LUNGS: Respirations unlabored. Speaking comfortably in full sentences. CTAB. ABDOMEN: Soft, non-distended abdomen. Non tender to palpation. No guarding. No rebound. EXTREMITIES: no gross deformities. Moving all extremities. All extremities neurovascularly intact.   SKIN: Warm and dry. No acute rashes. NEUROLOGICAL: Alert and oriented. CN's 2-12 intact. No gross facial drooping. Strength 5/5, sensation intact.    PSYCHIATRIC: tearful, anxious    LABS  Results for orders placed or performed during the hospital encounter of 10/01/20   Acetaminophen Level   Result Value Ref Range    Acetaminophen Level <5 (L) 10 - 30 ug/mL   CBC Auto Differential   Result Value Ref Range    WBC 8.3 4.0 - 11.0 K/uL    RBC 4.30 4.00 - 5.20 M/uL    Hemoglobin 13.6 12.0 - 16.0 g/dL    Hematocrit 41.1 36.0 - 48.0 %    MCV 95.6 80.0 - 100.0 fL    MCH 31.5 26.0 - 34.0 pg    MCHC 33.0 31.0 - 36.0 g/dL    RDW 13.5 12.4 - 15.4 %    Platelets 768 580 - 321 K/uL    MPV 7.4 5.0 - 10.5 fL    Neutrophils % 65.6 %    Lymphocytes % 25.3 %    Monocytes % 7.1 %    Eosinophils % 1.6 %    Basophils % 0.4 %    Neutrophils Absolute 5.4 1.7 - 7.7 K/uL    Lymphocytes Absolute 2.1 1.0 - 5.1 K/uL    Monocytes Absolute 0.6 0.0 - 1.3 K/uL    Eosinophils Absolute 0.1 0.0 - 0.6 K/uL    Basophils Absolute 0.0 0.0 - 0.2 K/uL   Comprehensive Metabolic Panel w/ Reflex to MG   Result Value Ref Range    Sodium 139 136 - 145 mmol/L    Potassium reflex Magnesium 3.9 3.5 - 5.1 mmol/L    Chloride 104 99 - 110 mmol/L    CO2 28 21 - 32 mmol/L    Anion Gap 7 3 - 16    Glucose 105 (H) 70 - 99 mg/dL    BUN 16 7 - 20 mg/dL    CREATININE <0.5 (L) 0.6 - 1.1 mg/dL    GFR Non-African American >60 >60    GFR African American >60 >60    Calcium 9.1 8.3 - 10.6 mg/dL    Total Protein 6.4 6.4 - 8.2 g/dL    Alb 4.1 3.4 - 5.0 g/dL    Albumin/Globulin Ratio 1.8 1.1 - 2.2    Total Bilirubin 0.6 0.0 - 1.0 mg/dL    Alkaline Phosphatase 58 40 - 129 U/L    ALT 6 (L) 10 - 40 U/L    AST 13 (L) 15 - 37 U/L    Globulin 2.3 g/dL   Ethanol   Result Value Ref Range    Ethanol Lvl None Detected mg/dL   Salicylate   Result Value Ref Range    Salicylate, Serum 2.1 (L) 15.0 - 30.0 mg/dL   Urine Drug Screen   Result Value Ref Range    Amphetamine Screen, Urine Neg Negative <1000ng/mL    Barbiturate Screen, Ur Neg Negative <200 ng/mL    Benzodiazepine Screen, Urine Neg Negative <200 ng/mL    Cannabinoid Scrn, Ur POSITIVE (A) Negative <50 ng/mL    Cocaine Metabolite Screen, Urine Neg Negative <300 ng/mL    Opiate Scrn, Ur Neg Negative <300 ng/mL    PCP Screen, Urine Neg Negative <25 ng/mL    Methadone Screen, Urine Neg Negative <300 ng/mL    Propoxyphene Scrn, Ur Neg Negative <300 ng/mL    Oxycodone Urine Neg Negative <100 ng/ml    pH, UA 6.0     Drug Screen Comment: see below        I have reviewed all labs for this visit. RADIOLOGY  No results found. ED COURSE/MDM  Patient seen and evaluated. At presentation, patient was, answering questions appropriately, anxious, tearful, hemodynamically stable, and satting well on room air. Patient denied having SI/HI/auditory or visual hallucinations. Labs unremarkable with normal creatinine, negative alcohol, wnl salicylate, negative acetaminophen, and UDS positive for cannabinoids. Patient medically cleared. Patient evaluated by psychiatry and cleared for discharge. Patient discharged home with instructions to follow-up as outpatient and strict return precautions. She verbalized understanding and was agreeable with plan. During the patient's ED course, the patient was given:  Medications   acetaminophen (TYLENOL) 160 MG/5ML solution 650 mg (650 mg Oral Given 10/1/20 1631)   ibuprofen (ADVIL;MOTRIN) 100 MG/5ML suspension 400 mg (400 mg Oral Given 10/1/20 1631)        CLINICAL IMPRESSION  1. Mood disorder (HCC)        Blood pressure 102/62, pulse 70, temperature 97.5 °F (36.4 °C), temperature source Oral, resp. rate 14, height 5' 6\" (1.676 m), weight 220 lb (99.8 kg), SpO2 100 %, not currently breastfeeding. DISPOSITION  Car WagonerАндрейmary was discharged home in stable condition. Patient was given scripts for the following medications. I counseled patient how to take these medications.    Discharge Medication List as of 10/1/2020  5:30 PM          Follow-up with:  No follow-up provider specified. DISCLAIMER: This chart was created using Dragon dictation software. Efforts were made by me to ensure accuracy, however some errors may be present due to limitations of this technology and occasionally words are not transcribed correctly.         Jesus Garibay MD  10/02/20 0106
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
## 2020-10-01 NOTE — ED NOTES
Pt changed into gown and belongings secured in locker. Blood and urine specimen obtained and sent to lab. JOSE process explained and pt verbalized her understanding.       Roxana Carolina RN  10/01/20 6645

## 2020-10-05 ENCOUNTER — HOSPITAL ENCOUNTER (OUTPATIENT)
Dept: PSYCHIATRY | Age: 45
Setting detail: THERAPIES SERIES
Discharge: HOME | End: 2020-10-05
Payer: COMMERCIAL

## 2020-10-05 VITALS — TEMPERATURE: 97.8 F

## 2020-10-05 RX ORDER — BUPROPION HYDROCHLORIDE 100 MG/1
100 TABLET ORAL DAILY
COMMUNITY
Start: 2020-07-12 | End: 2021-07-04 | Stop reason: ALTCHOICE

## 2020-10-05 RX ORDER — MAGNESIUM 30 MG
30 TABLET ORAL 2 TIMES DAILY
COMMUNITY
End: 2021-07-04 | Stop reason: ALTCHOICE

## 2020-10-05 RX ORDER — MULTIVIT WITH MINERALS/LUTEIN
500 TABLET ORAL DAILY
COMMUNITY

## 2020-10-05 RX ORDER — OMEGA-3/DHA/EPA/FISH OIL 60 MG-90MG
500 CAPSULE ORAL
COMMUNITY
End: 2021-11-15 | Stop reason: ALTCHOICE

## 2020-10-05 RX ORDER — VIT C/B6/B5/MAGNESIUM/HERB 173 50-5-6-5MG
250 CAPSULE ORAL 2 TIMES DAILY
COMMUNITY
End: 2021-11-15 | Stop reason: ALTCHOICE

## 2020-10-05 ASSESSMENT — PATIENT HEALTH QUESTIONNAIRE - PHQ9: SUM OF ALL RESPONSES TO PHQ QUESTIONS 1-9: 17

## 2020-10-05 ASSESSMENT — SLEEP AND FATIGUE QUESTIONNAIRES
DO YOU HAVE DIFFICULTY SLEEPING: YES
AVERAGE NUMBER OF SLEEP HOURS: 6
SLEEP PATTERN: DIFFICULTY FALLING ASLEEP
DIFFICULTY FALLING ASLEEP: YES
DIFFICULTY ARISING: NO
DIFFICULTY STAYING ASLEEP: YES
DO YOU USE A SLEEP AID: YES
RESTFUL SLEEP: YES

## 2020-10-05 ASSESSMENT — ANXIETY QUESTIONNAIRES
1. FEELING NERVOUS, ANXIOUS, OR ON EDGE: 3-NEARLY EVERY DAY
5. BEING SO RESTLESS THAT IT IS HARD TO SIT STILL: 3-NEARLY EVERY DAY
3. WORRYING TOO MUCH ABOUT DIFFERENT THINGS: 3-NEARLY EVERY DAY
6. BECOMING EASILY ANNOYED OR IRRITABLE: 3-NEARLY EVERY DAY
7. FEELING AFRAID AS IF SOMETHING AWFUL MIGHT HAPPEN: 3-NEARLY EVERY DAY
2. NOT BEING ABLE TO STOP OR CONTROL WORRYING: 3-NEARLY EVERY DAY
4. TROUBLE RELAXING: 3-NEARLY EVERY DAY
GAD7 TOTAL SCORE: 21

## 2020-10-05 ASSESSMENT — LIFESTYLE VARIABLES: HISTORY_ALCOHOL_USE: YES
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
## 2020-10-07 ENCOUNTER — HOSPITAL ENCOUNTER (OUTPATIENT)
Dept: PSYCHIATRY | Age: 45
Setting detail: THERAPIES SERIES
Discharge: HOME | End: 2020-10-07
Payer: COMMERCIAL

## 2020-10-07 VITALS
RESPIRATION RATE: 16 BRPM | DIASTOLIC BLOOD PRESSURE: 53 MMHG | HEART RATE: 69 BPM | TEMPERATURE: 97.6 F | SYSTOLIC BLOOD PRESSURE: 112 MMHG

## 2020-10-07 PROBLEM — F60.4 HISTRIONIC PERSONALITY DISORDER (HCC): Status: ACTIVE | Noted: 2020-10-07

## 2020-10-07 PROBLEM — F39 MOOD DISORDER (HCC): Status: ACTIVE | Noted: 2020-10-07

## 2020-10-07 PROCEDURE — 90853 GROUP PSYCHOTHERAPY: CPT | Performed by: COUNSELOR

## 2020-10-07 PROCEDURE — 90853 GROUP PSYCHOTHERAPY: CPT

## 2020-10-07 PROCEDURE — 90792 PSYCH DIAG EVAL W/MED SRVCS: CPT | Performed by: PSYCHIATRY & NEUROLOGY

## 2020-10-07 RX ORDER — ARIPIPRAZOLE 5 MG/1
5 TABLET ORAL EVERY EVENING
Qty: 30 TABLET | Refills: 0 | Status: SHIPPED | OUTPATIENT
Start: 2020-10-07 | End: 2020-11-02 | Stop reason: SDUPTHER

## 2020-10-07 RX ORDER — ERGOCALCIFEROL 1.25 MG/1
50000 CAPSULE ORAL WEEKLY
Qty: 4 CAPSULE | Refills: 0 | Status: SHIPPED | OUTPATIENT
Start: 2020-10-07

## 2020-10-07 RX ORDER — UBIDECARENONE 75 MG
100 CAPSULE ORAL WEEKLY
Qty: 5 TABLET | Refills: 0 | Status: SHIPPED | OUTPATIENT
Start: 2020-10-07 | End: 2020-11-06

## 2020-10-07 RX ORDER — ANTACID TABLETS 500 MG/1
500 TABLET, CHEWABLE ORAL DAILY
Qty: 30 TABLET | Refills: 0 | Status: SHIPPED | OUTPATIENT
Start: 2020-10-07 | End: 2021-11-15 | Stop reason: ALTCHOICE

## 2020-10-07 NOTE — GROUP NOTE
Group Therapy Note    Date: 10/7/2020    Group Start Time:  2:00 PM  Group End Time:  3:00 PM  Group Topic: Recovery    [facility]    Susana Livingston, [facility]        Group Therapy Note    Attendees: 4         Patient's Goal:  Patient will complete worksheet on Living with Fear. Will explore ways that fear effects mood and mental health. Will explore how fear effects various areas of life. Notes: Patient attended group. Completed the worksheet and discussed. Patient gave examples of how fear affects her relationships. Talked about how fear effects other areas of her life. Processed first day jitters. Received support and feedback from peers. Status After Intervention:  Improved    Participation Level:  Active Listener and Interactive    Participation Quality: Appropriate, Attentive, Sharing and Supportive    Speech:  normal    Thought Process/Content: Logical Linear    Affective Functioning: Congruent    Mood: anxious and depressed    Level of consciousness:  Oriented x4    Response to Learning: Able to verbalize current knowledge/experience    Endings: None Reported    Modes of Intervention: Education, Support, Socialization and Exploration    Discipline Responsible: /Counselor    Signature:  Susana Livingston, [facility]

## 2020-10-07 NOTE — GROUP NOTE
Group Therapy Note    Date: 10/7/2020    Group Start Time:  3:15 PM  Group End Time:  4:15 PM  Group Topic: Music Therapy    [facility]    Jeremy Camposx        Group Therapy Note    Attendees: 4       Group focused on assertive communication, I statements, and education on multiple methods of interpersonal assertiveness: D.E.A.R.M.A.N. (objective), F.A.S.T. (relational), G.I.V.E.(self-respect). Patients utilized song \"Paranoia in [employer]Flat [place]\" by [employer] in ez analysis relating to group education topics. Notes:  Patient present across group interventions, demonstrated appropriate social engagement and reflective discussion. Exaggerated mannerisms and expression, quickly shifting between being tearful while discussing topics to laughing and smiling. Pt discussed relationship issues with step-daughter, inability to assert self in conflicts with her due to her self-doubt. Pt reports struggling with not being her birth mother, relating this to the loss of her natural child who would be the same age as her step-daughter. Pt providing feedback and collaborative discussion with group members. Status After Intervention:  Improved    Participation Level:  Active Listener and Interactive    Participation Quality: Appropriate and Attentive      Speech:  normal      Thought Process/Content: Logical      Affective Functioning: Congruent      Mood: Positive and Relaxed, fluctuating frequently      Level of consciousness:  Alert and Attentive      Response to Learning: Able to verbalize/acknowledge new learning and Progressing to goal      Endings: None Reported    Modes of Intervention: Exploration, Clarifying, Activity and Media      Discipline Responsible: Psychoeducational Specialist      Signature:  Golden Oropeza, MM, MT-BC
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
## 2020-10-09 ENCOUNTER — HOSPITAL ENCOUNTER (OUTPATIENT)
Dept: PSYCHIATRY | Age: 45
Setting detail: THERAPIES SERIES
Discharge: HOME | End: 2020-10-09
Payer: COMMERCIAL

## 2020-10-09 VITALS — TEMPERATURE: 98.2 F

## 2020-10-09 PROCEDURE — 90853 GROUP PSYCHOTHERAPY: CPT

## 2020-10-09 NOTE — GROUP NOTE
Group Therapy Note    Date: 10/9/2020    Group Start Time:  3:15 PM  Group End Time:  4:30 PM  Group Topic: Art Therapy     [facility] TRENTON Calderon, ATR        Group Therapy Note    Attendees: 4         Patient's Goal:  Patients were invited to continue to participate in the Art Therapy / Psycho-Education activity on grounding and processing which had been started during the 2:00 pm therapy session. Patients were invited to continue creating an art piece reflecting and exploring their thoughts and feelings regarding an issue or emotion they were experiencing. Once their art piece was complete, patients then received a poetry writing prompt to explore the meaning of their art piece further. At the end of group, patients were encouraged to share their art and/or poem with the group. Notes:  Belkis Coombs appeared to engage in grounding exercise, participated in art making and in appropriate conversation with peers, and shared and processed work at the end of session, reflecting on learning \"how to love myself\" and letting go of resentments of others. Status After Intervention:  Improved    Participation Level:  Active Listener and Interactive    Participation Quality: Appropriate, Attentive, Sharing and Supportive      Speech:  normal      Thought Process/Content: Logical      Affective Functioning: Exaggerated      Mood: anxious and euphoric      Level of consciousness:  Alert, Oriented x4 and Attentive      Response to Learning: Able to verbalize current knowledge/experience, Able to verbalize/acknowledge new learning, Able to retain information and Capable of insight      Endings: None Reported    Modes of Intervention: Education, Support, Socialization, Exploration, Clarifying, Problem-solving, Activity, Media and Reality-testing      Discipline Responsible: Psychoeducational Specialist      Signature:  Lorna Nichols
Group Therapy Note    Date: 10/9/2020    Group Start Time: 12:30 PM  Group End Time:  1:47 PM  Group Topic: Psychotherapy    [facility]      Mag Cervantes, IMFT, LICDC, LSW      Group Therapy Note    Attendees: 5         Patient's Goal:  Explore relationship dynamics and how their mental illness impacts their ability to function in relationships. Discuss and learn skills related to communicating in an effective manner within those relationships. Notes:  Pt reported that she would like to learn to love herself so that her  can truly love her. Pt reported issues with anger management and that her  also has anger management issues. Status After Intervention:  Improved    Participation Level:  Active Listener and Interactive    Participation Quality: Appropriate, Attentive, Sharing and Supportive      Speech:  normal      Thought Process/Content: Logical      Affective Functioning: Congruent      Mood: elevated      Level of consciousness:  Alert, Oriented x4 and Attentive      Response to Learning: Able to verbalize current knowledge/experience, Able to verbalize/acknowledge new learning, Able to retain information, Capable of insight, Able to change behavior and Progressing to goal      Endings: None Reported    Modes of Intervention: Education, Support, Socialization, Exploration, Clarifying, Problem-solving, Confrontation, Limit-setting and Reality-testing      Discipline Responsible: /Counselor      Signature:  Ale King [address]
ATR

## 2020-10-12 ENCOUNTER — HOSPITAL ENCOUNTER (OUTPATIENT)
Dept: PSYCHIATRY | Age: 45
Setting detail: THERAPIES SERIES
Discharge: HOME | End: 2020-10-12
Payer: COMMERCIAL

## 2020-10-12 VITALS — TEMPERATURE: 97.1 F

## 2020-10-12 PROCEDURE — 90853 GROUP PSYCHOTHERAPY: CPT

## 2020-10-12 PROCEDURE — 90853 GROUP PSYCHOTHERAPY: CPT | Performed by: COUNSELOR

## 2020-10-12 NOTE — GROUP NOTE
Group Therapy Note    Date: 10/12/2020    Group Start Time: 12:30 PM  Group End Time:  1:45 PM  Group Topic: Psychotherapy    [facility]    Megan Murillo [facility]    Group Therapy Note    Attendees: 4    Patient shared and set a goal at the beginning of group to practice a new way of being in group today. Notes: Pt set goal to [facility] on abandonment issues and try not to feel abandoned today\". Pt shared she fears being abandoned and encouraged group members to redirect her if she is talking too much during group. Pt seemed to react to peers with affirming language often during group and shared some with group her struggles with her relationship with her  who she described as being \"narcissitic\".     Status After Intervention:  Improved    Participation Level: Interactive    Participation Quality: Appropriate      Speech:  normal      Thought Process/Content: Linear      Affective Functioning: Flat and Constricted/Restricted      Mood: anxious and depressed      Level of consciousness:  Alert and Oriented x4      Response to Learning: Able to verbalize current knowledge/experience, Able to verbalize/acknowledge new learning and Able to retain information      Endings: None Reported    Modes of Intervention: Education, Support, Socialization, Exploration, Clarifying and Problem-solving      Discipline Responsible: /Counselor      Signature:  ELIESER Chavez, LUCY

## 2020-10-12 NOTE — GROUP NOTE
Group Therapy Note    Date: 10/12/2020    Group Start Time:  3:15 PM  Group End Time:  4:30 PM  Group Topic: Music Therapy    [facility]        Group Therapy Note    Group topic - Vulnerability and Self-sabotage    Group members processed definitions of vulnerability, explored influences and sources of their personal vulnerability. Discussed vulnerability in relationships and self-expression, self-sabotage principles and behaviors. Group members discussed defense mechanisms, and core vulnerabilities of fear vs shame. Related these topics to lyrics \"Dust to Dust\" by the Civil Wars, concluded group with creative writing intervention exploring personal self-sabotage. Attendees: 5         Notes:  During group pt engaged by providing feedback to multiple group members across session. Contributions to group processing focused on generational changes, reporting that she feels she has become more vulnerable as she has gotten older. Expressing desire to continue explore defense strategies in future groups. Status After Intervention:  Improved    Participation Level:  Active Listener    Participation Quality: Appropriate and Supportive      Speech:  normal      Thought Process/Content: Logical      Affective Functioning: Congruent      Mood: euthymic      Level of consciousness:  Alert and Attentive      Response to Learning: Capable of insight and Progressing to goal      Endings: None Reported    Modes of Intervention: Education, Exploration and Problem-solving      Discipline Responsible: Psychoeducational Specialist      Signature:  Mirian Villarreal MM, MT-BC

## 2020-10-12 NOTE — GROUP NOTE
Group Therapy Note    Date: 10/12/2020    Group Start Time:  2:00 PM  Group End Time:  3:00 PM  Group Topic: Recovery    [facility]    Soila Pereira [address]        Group Therapy Note    Attendees: 5       Patient's Goal:  Patient will complete worksheet on Things I Cannot Control. Will discuss things one can and cannot control. Notes:  Patient engaged well in group. Completed the worksheet and discussed. She talked about her failed attempts to make others happy and how this has effected her. Status After Intervention:  Improved    Participation Level:  Active Listener and Interactive    Participation Quality: Appropriate, Attentive, Sharing and Supportive    Speech:  normal    Thought Process/Content: Logical  Linear    Affective Functioning: Congruent    Mood: anxious and depressed    Level of consciousness:  Oriented x4    Response to Learning: Able to verbalize current knowledge/experience and Capable of insight    Endings: None Reported    Modes of Intervention: Education, Support, Socialization and Exploration    Discipline Responsible: /Counselor     Signature:  Soila Pereira

## 2020-10-14 ENCOUNTER — HOSPITAL ENCOUNTER (OUTPATIENT)
Dept: PSYCHIATRY | Age: 45
Setting detail: THERAPIES SERIES
Discharge: HOME | End: 2020-10-14
Payer: COMMERCIAL

## 2020-10-14 VITALS — TEMPERATURE: 98.4 F

## 2020-10-14 PROCEDURE — 90853 GROUP PSYCHOTHERAPY: CPT

## 2020-10-14 PROCEDURE — 90853 GROUP PSYCHOTHERAPY: CPT | Performed by: COUNSELOR

## 2020-10-14 NOTE — GROUP NOTE
Group Therapy Note    Date: 10/14/2020    Group Start Time:  2:00 PM  Group End Time:  3:00 PM  Group Topic: Psychotherapy    [facility] SK[facility]    Prosper Salazar, [facility]    Group Therapy Note    Attendees: 4    Patient shared and set a goal at the beginning of group to practice a new way of being in group today. Notes:  Pt set goal to [facility] on anger management\". Pt was engaged in group and seemed to relate and connect with peers in group. Pt seemed to listen and leave more space for peers to share during group and offered peers supportive feedback than in previous psychotherapy group this week. Status After Intervention:  Improved    Participation Level:  Active Listener and Interactive    Participation Quality: Appropriate, Attentive and Sharing      Speech:  normal      Thought Process/Content: Linear      Affective Functioning: Constricted/Restricted      Mood: anxious      Level of consciousness:  Alert and Oriented x4      Response to Learning: Able to verbalize current knowledge/experience, Able to verbalize/acknowledge new learning and Able to retain information      Endings: None Reported    Modes of Intervention: Education, Support, Socialization, Exploration, Clarifying and Problem-solving      Discipline Responsible: /Counselor      Signature:  CHARO See-S, R-ENZO

## 2020-10-14 NOTE — GROUP NOTE
Group Therapy Note    Date: 10/14/2020    Group Start Time:  3:15 PM  Group End Time:  4:30 PM  Group Topic: Psychoeducation    [facility]    Jeremy Camposx        Group Therapy Note    Group members educated on mindfulness practice, provided opportunity for engagement in experiential interventions. Therapist led deep breathing exercises, 5-4-3-2-1 de-escalation, expressive movements w/ I statements. Attendees: 5       Notes:  Patient actively engaged across interventions and exercises, positively encouraging peers. Patient made multiple statements of release - \"I am pushing away fear. I am pushing away anger. \" \"I am letting in love. I am letting in patience. \"    Status After Intervention:  Improved    Participation Level:  Active Listener and Interactive    Participation Quality: Appropriate and Sharing      Speech:  normal      Thought Process/Content: Logical      Affective Functioning: Congruent      Mood: Positive and Relaxed      Level of consciousness:  Alert and Attentive      Response to Learning: Able to verbalize current knowledge/experience, Able to retain information, Capable of insight and Progressing to goal      Endings: None Reported    Modes of Intervention: Education, Exploration, Activity, Movement and Media      Discipline Responsible: Psychoeducational Specialist      Signature:  Bruce Monet, MM, MT-BC

## 2020-10-14 NOTE — GROUP NOTE
Group Therapy Note    Date: 10/14/2020    Group Start Time: 12:30 PM  Group End Time:  1:45 PM  Group Topic: Recovery    [facility]    Srinivas Ltitle, [facility]        Group Therapy Note    Attendees: 4         Patient's Goal:  Patient will complete worksheet on boundaries and will discuss how they apply to mental wellbeing. Notes:  Patient attended group. Completed the worksheet and discussed in group. Verbalized a basic understanding of boundaries and gave examples of poor and healthy boundaries from her past experiences. Status After Intervention:  Improved    Participation Level:  Active Listener and Interactive    Participation Quality: Appropriate and Attentive    Speech:  normal    Thought Process/Content: Logical    Affective Functioning: Congruent    Mood: anxious, depressed     Level of consciousness:  Oriented x4    Response to Learning: Able to verbalize current knowledge/experience and Able to verbalize/acknowledge new learning    Endings: None Reported    Modes of Intervention: Education, Support, Socialization and Exploration    Discipline Responsible: /Counselor    Signature:  Srinivas Little, [facility]

## 2020-10-16 ENCOUNTER — HOSPITAL ENCOUNTER (OUTPATIENT)
Dept: PSYCHIATRY | Age: 45
Setting detail: THERAPIES SERIES
Discharge: HOME | End: 2020-10-16
Payer: COMMERCIAL

## 2020-10-16 VITALS — TEMPERATURE: 97.7 F

## 2020-10-16 PROCEDURE — 90853 GROUP PSYCHOTHERAPY: CPT | Performed by: COUNSELOR

## 2020-10-16 PROCEDURE — 90853 GROUP PSYCHOTHERAPY: CPT

## 2020-10-16 NOTE — GROUP NOTE
Group Therapy Note    Date: 10/16/2020    Group Start Time:  3:15 PM  Group End Time:  4:30 PM  Group Topic: Music Therapy    [facility]    Jeremy Camposx        Group Therapy Note    Group Topic: Depression Education and Management     Group members processed self-identified definitions of depression, how it relates to reason for treatment in [facility]. Therapist facilitated discussion of stressors, physical behaviors and thoughts/feelings, and behavioral responses to depression. Group concluded with ez analysis of \"There's Gotta Be (More to Life)\" by Elizabeth Escoto and group members engaging in creative art activity, \"The Glass Half Full\". Attendees: 5           Notes:  Patient reported feeling \"drained\" today, decreased participation from groups prior with lethargy throughout. In expressing positive elements of personal life pt's contributions surface level with minimal reflection. Status After Intervention:  Improved    Participation Level:  Active Listener    Participation Quality: Appropriate and Attentive      Speech:  normal      Thought Process/Content: Logical      Affective Functioning: Congruent      Mood: depressed      Level of consciousness:  Alert and Attentive      Response to Learning: Progressing to goal      Endings: None Reported    Modes of Intervention: Support, Socialization and Clarifying      Discipline Responsible: Psychoeducational Specialist      Signature:  La Underwood, MM, MT-BC

## 2020-10-16 NOTE — GROUP NOTE
Group Therapy Note    Date: 10/16/2020    Group Start Time: 12:30 PM  Group End Time:  1:45 PM  Group Topic: Psychotherapy    [facility]    Juana Sol, [facility]        Group Therapy Note    Attendees: 3         Patient's Goal:  Pt's goal was to share and communicate in a calm. Notes:  Pt was engaged in group. Pt was open to the feedback from group. Pt met her goal.     Status After Intervention:  Improved    Participation Level:  Active Listener and Interactive    Participation Quality: Appropriate, Attentive, Sharing and Supportive      Speech:  normal      Thought Process/Content: Logical  Linear      Affective Functioning: Congruent      Mood: depressed      Level of consciousness:  Alert, Oriented x4 and Attentive      Response to Learning: Able to verbalize current knowledge/experience, Able to verbalize/acknowledge new learning, Able to retain information, Capable of insight, Able to change behavior and Progressing to goal      Endings: None Reported    Modes of Intervention: Education, Support, Socialization, Exploration, Clarifying, Problem-solving, Activity, Movement, Confrontation, Limit-setting and Reality-testing      Discipline Responsible: /Counselor      Signature:  Juana Sol, [facility]

## 2020-10-19 ENCOUNTER — HOSPITAL ENCOUNTER (OUTPATIENT)
Dept: PSYCHIATRY | Age: 45
Setting detail: THERAPIES SERIES
Discharge: HOME | End: 2020-10-19
Payer: COMMERCIAL

## 2020-10-19 VITALS — TEMPERATURE: 96.9 F

## 2020-10-19 PROCEDURE — 90853 GROUP PSYCHOTHERAPY: CPT

## 2020-10-19 NOTE — GROUP NOTE
Group Therapy Note    Date: 10/19/2020    Group Start Time:  2:00 PM  Group End Time:  3:00 PM  Group Topic: Cognitive Skills    [facility]    REYNA Ayala        Group Therapy Note    Attendees: 5         Patient's Goal:  Read and discussed the story \"Living Life Fully\" and applied to our lives regarding what the kevin present is and how to learn to live and be in the present moment. Notes:  Pt actively participated in the group discussion and was able to apply to her life. Pt also supportive of other group member during discussion. Status After Intervention:  Improved    Participation Level:  Active Listener and Interactive    Participation Quality: Appropriate, Attentive, Sharing and Supportive      Speech:  normal      Thought Process/Content: Logical      Affective Functioning: Congruent      Mood: anxious      Level of consciousness:  Alert, Oriented x4 and Attentive      Response to Learning: Able to verbalize current knowledge/experience, Able to verbalize/acknowledge new learning and Progressing to goal      Endings: None Reported    Modes of Intervention: Education, Support, Socialization, Exploration and Clarifying      Discipline Responsible: /Counselor      Signature:  REYNA Paulino
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
## 2020-10-21 ENCOUNTER — HOSPITAL ENCOUNTER (OUTPATIENT)
Dept: PSYCHIATRY | Age: 45
Setting detail: THERAPIES SERIES
Discharge: HOME | End: 2020-10-21
Payer: COMMERCIAL

## 2020-10-21 VITALS — TEMPERATURE: 98.2 F

## 2020-10-21 PROCEDURE — 90853 GROUP PSYCHOTHERAPY: CPT

## 2020-10-21 NOTE — GROUP NOTE
Group Therapy Note    Date: 10/21/2020    Group Start Time:  3:15 PM  Group End Time:  4:30 PM  Group Topic: Psychoeducation    [facility]    Jeremy Camposx        Group Therapy Note    Group started with a brief moment to allow group members to pick a single song ez from a list. Patients then utilized visual arts to create a collage representing the ez of choice as it relates to them, individually. This writer provided patients with resources for engagement in creative arts intervention, provided background music to support reflective focus on interventions. Provided opportunity for group members to share at conclusion. Attendees: 8         Notes:  Patient worked on collage across time allotted. Collage focused on pt's \"kid at heart\" mentality, voicing that she chose colors and subjects that spark \"escapism and tammy\". Status After Intervention:  Improved    Participation Level:  Active Listener    Participation Quality: Appropriate      Speech:  normal      Thought Process/Content: Logical      Affective Functioning: Congruent      Mood: euthymic      Level of consciousness:  Alert and Attentive      Response to Learning: Able to verbalize current knowledge/experience, Able to retain information, Capable of insight and Progressing to goal      Endings: None Reported    Modes of Intervention: Exploration, Activity and Media      Discipline Responsible: Psychoeducational Specialist      Signature:  Lance Trujillo, MM, MT-BC

## 2020-10-21 NOTE — GROUP NOTE
Group Therapy Note    Date: 10/21/2020    Group Start Time:  2:05 PM  Group End Time:  3:05 PM  Group Topic: Psychoeducation    MEJIA[facility] OP TRENTON [place], [facility]    Group Therapy Note    Attendees: 8    Patients learned about cognitive distortions and the importance of observing thoughts, identifying, and challenging cognitive distortions. The group discussed the cognitive distortion worksheets and patients were encouraged to use CBT thought record this week. Notes:  Pt was talkative during group discussion though was tangential in her verbal responses during the group. Status After Intervention:  Improved    Participation Level:  Active Listener and Interactive    Participation Quality: Sharing      Speech:  normal      Thought Process/Content: Flight of Ideas      Affective Functioning: Flat and Constricted/Restricted      Mood: anxious and depressed      Level of consciousness:  Alert and Oriented x4      Response to Learning: Able to verbalize current knowledge/experience, Able to verbalize/acknowledge new learning and Able to retain information      Endings: None Reported    Modes of Intervention: Education, Support, Socialization, Exploration, Clarifying, Problem-solving and Activity      Discipline Responsible: /Counselor      Signature:  Alberto Madrid, CHARO-S, R-LOULOUT

## 2020-10-21 NOTE — GROUP NOTE
Group Therapy Note    Date: 10/21/2020    Group Start Time: 12:30 PM  Group End Time:  1:45 PM  Group Topic: Psychotherapy    [facility]    Bibi Calderon, ATR        Group Therapy Note    Attendees: 7         Patient's Goal: Patient was invited to participate in Psychotherapy group and to set a goal at the beginning of session to practice in group a new way of being in relationships. Notes:  Azul Connell shared her goal was to [place] in love with myself and my \" and to \"practice patience. \" Azul Connell attempted to support peers, but was often intrusive when trying to offer comfort, and required redirection from therapist, which she was respectful of. Status After Intervention:  Unchanged    Participation Level:  Active Listener, Interactive and Monopolizing    Participation Quality: Attentive, Sharing, Supportive and Intrusive      Speech:  pressured      Thought Process/Content: Linear      Affective Functioning: Constricted/Restricted      Mood: anxious      Level of consciousness:  Alert, Oriented x4 and Attentive      Response to Learning: Able to verbalize current knowledge/experience, Able to verbalize/acknowledge new learning, and Able to retain information       Endings: None Reported    Modes of Intervention: Education, Support, Socialization, Exploration, Clarifying, Problem-solving, Activity and Reality-testing      Discipline Responsible: Psychoeducational Specialist      Signature:  Bibi 056Lorna Lima

## 2020-10-23 ENCOUNTER — HOSPITAL ENCOUNTER (OUTPATIENT)
Dept: PSYCHIATRY | Age: 45
Setting detail: THERAPIES SERIES
Discharge: HOME | End: 2020-10-23
Payer: COMMERCIAL

## 2020-10-23 VITALS — TEMPERATURE: 98.3 F

## 2020-10-23 PROCEDURE — 90853 GROUP PSYCHOTHERAPY: CPT

## 2020-10-23 PROCEDURE — 90853 GROUP PSYCHOTHERAPY: CPT | Performed by: COUNSELOR

## 2020-10-23 NOTE — SUICIDE SAFETY PLAN
SAFETY PLAN    A suicide Safety Plan is a document that supports someone when they are having thoughts of suicide. Warning Signs that indicate a suicidal crisis may be developing: What (situations, thoughts, feelings, body sensations, behaviors, etc.) do you experience that lets you know you are beginning to think about suicide? 1. Thinking about alcohol  2. nightmares  3. Seeing spiders    Internal Coping Strategies:  What things can I do (relaxation techniques, hobbies, physical activities, etc.) to take my mind off my problems without contacting another person? 1. [place]  2. Meditate  3. Art  4. Puzzles    People and social settings that provide distraction: Who can I call or where can I go to distract me  1. . Place: gym            2. Place: Judaism  3. Home    People whom I can ask for help: Who can I call when I need help - for example, friends, family, clergy, someone else? 1. Name: Robermirna Mcqueenes                Phone: [phone]  2. Name: Rosalio Giraldo  Phone: does not have number  3. Name: Warren Mckeon  Phone: does not have number    Professionals or [address] [place] [address] Blvd I can contact during a crisis: Who can I call for help - for example, my doctor, my psychiatrist, my psychologist, a mental health provider, a suicide hotline? 1. Clinician Name: Dr. Sharyle Cong  Phone: [phone]          2. Clinician Name: Dr. Omar Romero   Phone: [phone]      Clinician Pager or Emergency Contact #: [phone]    0. Suicide Prevention Lifeline: 3-390-838-TALK (5266)   [facility] [phone] ([phone])   [place] (8380)    4. [address] [place] [address] Emergency Services -  for example, [address], [facility] suicide hotline:    [address] [facility] [employer] is a provider of mental health and case management services in [facility]. Open enrollment is available.   Please call them at [phone] to set up an appointment for open enrollment, which is Monday - Friday from 9:30 AM to 11:00 AM.  Please bring a photo ID, insurance card, proof of address, current medicines, and copay. Sliding fee scale is available for [facility] residents with proof of residency. Name of Provider: [address] [place] [address] [employer]   Provider specialty/license: REYNA/CHARO/MD  The type/s of services requested are: Counseling/therapy  Agency name: [facility] [employer]  Address: [address] [place] [address], [address] [place] [address],[address]  Phone Number: [phone]   Special instructions (what to bring to appointment, etc.): Please bring a photo ID, insurance card, proof of address, current medicines, and copay. Making the environment safe: How can I make my environment (house/apartment/living space) safer? For example, can I remove guns, medications, and other items? 1. No self harm, \"I do not believe in it, I hate pain\"    Something that is important to me and worth living for is. ... 1. My   2. My mother  3. Friends  4.  Myself

## 2020-10-23 NOTE — GROUP NOTE
Group Therapy Note    Date: 10/23/2020    Group Start Time:  3:15 PM  Group End Time:  4:30 PM  Group Topic: Music Therapy    [facility]    Jeremy Soliman        Group Therapy Note    Music therapy group focused on worry/anxiety management. Patients discussed physical symptoms of anxiety, sources of anxiety, processed differences between hypothetical and reality-based anxiety, with therapist-facilitated problem solving in self-assessment. Patients engaged in ez analysis intervention using song \"Paranoia in [place]\" by [employer], using lyrics for reflection on personal anxiety/worry. Group concluded with members engaging in role-play exercise with group members acting as internal monologue in self-questioning for other group members. Attendees: 4         Notes:  During group pt discussed financial stressors, the repercussions of having to borrow money from others to pay bills. Group members allowed pt to vent and explore feelings about this, encouraged processing of pos/neg circumstances moving forward. Domenica Mccoy explored the impact of her relationship with her , his impulses too discredit her emotional expression, and how she is feeling better enabled to \"set healthy assertive boundaries for myself as a 54year old grown woman. \"    Status After Intervention:  Improved    Participation Level:  Active Listener and Interactive    Participation Quality: Appropriate, Sharing and Supportive      Speech:  normal      Thought Process/Content: Logical      Affective Functioning: Congruent      Mood: euthymic      Level of consciousness:  Alert and Attentive      Response to Learning: Able to verbalize current knowledge/experience, Able to verbalize/acknowledge new learning, Capable of insight and Progressing to goal      Endings: None Reported    Modes of Intervention: Education, Support, Exploration, Clarifying, Problem-solving and Media      Discipline Responsible: Psychoeducational Specialist      Signature:  La [place] MM, MT-BC

## 2020-10-23 NOTE — GROUP NOTE
Group Therapy Note    Date: 10/23/2020    Group Start Time:  2:00 PM  Group End Time:  3:05 PM  Group Topic: Cognitive Skills    [facility]      JIM Echeverria, GEORGETTE, LSW      Group Therapy Note    Attendees: 4         Patient's Goal:  Discuss the various types of boundaries and traits of porus/ridgid vs healthy boundaries. Engaged in a boundary exploration activity, identifying a relationship whom she/he struggles to set healthy boundaries, what actions he/she can take to improve boundaries, expectations, and what may be different once healthy boundaries are established. Discussed tips to healthy boundaries and how to apply those to the relationship identified. Notes:  Pt identified difficulty in setting boundaries with her . Pt vocalized that she does not believe he will like it when she attempts to set healthy boundaries. Pt verbalized that she may start setting boundaries for herself before attempting to set boundaries with others. Status After Intervention:  Improved    Participation Level:  Active Listener and Interactive    Participation Quality: Appropriate, Attentive, Sharing and Supportive      Speech:  normal      Thought Process/Content: Logical      Affective Functioning: Congruent      Mood: elevated      Level of consciousness:  Alert, Oriented x4 and Attentive      Response to Learning: Able to verbalize current knowledge/experience, Able to verbalize/acknowledge new learning, Able to retain information, Capable of insight, Able to change behavior and Progressing to goal      Endings: None Reported    Modes of Intervention: Education, Support, Socialization, Exploration, Clarifying, Problem-solving, Activity, Confrontation, Limit-setting and Reality-testing      Discipline Responsible: /Counselor      Signature:  JIM Echeverria [address]

## 2020-10-26 ENCOUNTER — HOSPITAL ENCOUNTER (OUTPATIENT)
Dept: PSYCHIATRY | Age: 45
Setting detail: THERAPIES SERIES
Discharge: HOME | End: 2020-10-26
Payer: COMMERCIAL

## 2020-10-26 VITALS — TEMPERATURE: 97.7 F

## 2020-10-26 PROCEDURE — 90853 GROUP PSYCHOTHERAPY: CPT

## 2020-10-26 NOTE — GROUP NOTE
Group Therapy Note    Date: 10/26/2020    Group Start Time:  2:00 PM  Group End Time:  3:00 PM  Group Topic: Art Therapy     [facility]    Bibi Calderon, ATR        Group Therapy Note    Attendees: 6         Patient's Goal: Patients were invited to participate in an Art Therapy group on Radical Acceptance. Patient received a handout on Radical Acceptance and were invited to learn and discuss the concept. With therapist giving instructions, patients were asked to practice two radical acceptance DBT techniques: Willing Hands and Half-smile. Lastly, patients were asked to explore how they can practice radical acceptance in their lives using an art material of their choosing and were asked to share and process their work with the group. Notes:  Cheryle Brave appeared to listen attentively to group discussion, participated in conversation on Radical Acceptance, and practiced DBT techniques. Status After Intervention:  Unchanged    Participation Level:  Active Listener, Interactive and Monopolizing    Participation Quality: Appropriate, Attentive and Sharing      Speech:  normal      Thought Process/Content: Logical      Affective Functioning: Constricted/Restricted      Mood: anxious      Level of consciousness:  Alert, Oriented x4 and Attentive      Response to Learning: Able to verbalize current knowledge/experience, Able to verbalize/acknowledge new learning, Able to retain information and Capable of insight      Endings: None Reported    Modes of Intervention: Education, Support, Socialization, Exploration, Clarifying, Problem-solving, Activity, Media and Reality-testing      Discipline Responsible: Psychoeducational Specialist      Signature:  Lorna Townsend

## 2020-10-26 NOTE — GROUP NOTE
Group Therapy Note     Date: 10/26/2020     Group Start Time:  3:15 PM  Group End Time:  4:30 PM  Group Topic: Music Therapy     [facility] TRENTON Chanjudechaz Soliman           Group Therapy Note     Group Topic : Song Share     During group session, patients identified one song that elicits three separate emotions: self-reflection, relaxation, excitement. Therapist then played each song individually, allowing group members to discuss emotional responses, and guessing which group member selected the song. Group concluded with discussion of music as expression vs music as distraction, as well as circumstantial music preferences and \"day to day changes. \"     Attendees: 5           Notes: Active engagement in session, collaborative with peers in discussion while discussing use of music listening in daily life. Group members discussed music used for self-expression and distraction, used to separate self from circumstances vs validating feelings in their experience. Provided feedback and confirmation to group members across session in collaborative discussions. Status After Intervention:  Improved     Participation Level:  Active Listener and Interactive     Participation Quality: Appropriate, Attentive, Sharing and Supportive        Speech:  normal        Thought Process/Content: Logical        Affective Functioning: Congruent        Mood: euthymic        Level of consciousness:  Alert and Attentive        Response to Learning: Able to verbalize current knowledge/experience, Able to verbalize/acknowledge new learning and Progressing to goal        Endings: None Reported     Modes of Intervention: Socialization, Exploration and Media        Discipline Responsible: Psychoeducational Specialist        Signature:  Harvinder Wayne MM, MT-BC

## 2020-10-26 NOTE — GROUP NOTE
Group Therapy Note    Date: 10/26/2020    Group Start Time: 12:30 PM  Group End Time:  1:45 PM  Group Topic: Psychotherapy    [facility]    Abrahan Shoemaker, Soila Tello [address]    Group Therapy Note    Attendees: 6    Patient shared and set a goal at the beginning of group to practice a new way of being in group today. Notes:  Pt set goal to work on [employer] and \"happiness with self and \". Therapist challenged pt to be aware that happiness is not always possible and the importance of all emotions. Pt shared feeling hurt from feedback from a previous group when she was asked if she liked hearing herself speak; pt asked peer for perspective on this feedback from previous group. Pt seemed open to feedback from peer and shared she struggles at times with there being silence in the group. Therapist challenged and encouraged pt to work on accepting the silence in group at times and not filling the silence. Pt was talkative and appeared energetic during group and was responsive to peers with one word reactions during group. At end of group, pt shared frustrations of not having her money from her work. Status After Intervention:  Improved    Participation Level:  Active Listener and Interactive    Participation Quality: Appropriate and Attentive      Speech:  normal      Thought Process/Content: Perseverating      Affective Functioning: Congruent      Mood: euthymic      Level of consciousness:  Alert and Oriented x4      Response to Learning: Able to verbalize current knowledge/experience, Able to verbalize/acknowledge new learning and Able to retain information      Endings: None Reported    Modes of Intervention: Education, Support, Socialization, Exploration and Clarifying      Discipline Responsible: /Counselor      Signature:  ELIESER Dc, LUCY

## 2020-10-27 NOTE — BH NOTE
Treatment team met on 10/27/2020 to review and discuss pt's treatment in IOP. Pt started 3-day IOP on 10/7/2020. Pt has received challenging feedback from peers about her not allowing silence in the groups as she is often speaking up during group. Pt acknowledged how she is uncomfortable with silence in groups. Pt seems to continue to struggle with emotion dysregulation and seems to have limited insight during group therapy. Tx team discussed referring pt to [address] [place] [address] program at [facility] to help her with distress tolerance and emotion regulation skills. Pt is projected to discharge on 11/2/2020.      Alisson Guillen MA, R-DMT, LPCC-S

## 2020-10-28 ENCOUNTER — HOSPITAL ENCOUNTER (OUTPATIENT)
Dept: PSYCHIATRY | Age: 45
Setting detail: THERAPIES SERIES
Discharge: HOME | End: 2020-10-28
Payer: COMMERCIAL

## 2020-10-28 VITALS — TEMPERATURE: 96.9 F

## 2020-10-28 PROCEDURE — 90853 GROUP PSYCHOTHERAPY: CPT

## 2020-10-28 NOTE — GROUP NOTE
Group Therapy Note    Date: 10/28/2020    Group Start Time:  3:15 PM  Group End Time:  4:15 PM  Group Topic: Psychoeducation    MEJIAZ BARB Soliman        Group Therapy Note    Topic: Judgment, Criticism and Self-acceptance    Group began with 5-line drawing intervention, group members collaborating with a partner in completing 5 pieces of art. The goal of this experiences is to increase awareness of negative self-talk and judgmental instincts. Group continued with exploration of patient's individual behaviors in self-criticism and self-talk. Resources provided:   SMART Recovery: Thoughts to Help Increase Self-acceptance  [facility]rap[facility] [place]: 12 Ways to Accept Yourself    Attendees: 5         Notes:  During group pt discussed core belief of \"radical kindness\", stating that sometimes she ignores negative circumstance for sake of being kind to others and to self. Reports that sometimes she allows others to cause her harm to keep her from harming herself. Recognized that she deserves to grieve losses of unrealized dreams and potential, stating \"I need to let myself get into being the best me I can possibly be. \" At close of group, positively social with peers, expressing desire to utilize resources provided (self-acceptance statements) when home, will construct positive self-acceptance statements of her own and share on Friday. Status After Intervention:  Improved    Participation Level:  Active Listener and Interactive    Participation Quality: Appropriate and Attentive      Speech:  normal      Thought Process/Content: Logical      Affective Functioning: Congruent      Mood: euthymic and positive      Level of consciousness:  Alert      Response to Learning: Able to verbalize current knowledge/experience, Able to verbalize/acknowledge new learning, Capable of insight and Progressing to goal      Endings: None Reported    Modes of Intervention: Education, Support, Socialization, Activity and Media      Discipline Responsible: Psychoeducational Specialist      Signature:  Miriam Dotson MM, MT-BC

## 2020-10-28 NOTE — GROUP NOTE
Group Therapy Note    Date: 10/28/2020    Group Start Time: 12:30 PM  Group End Time:  1:45 PM  Group Topic: Psychoeducation    [facility]    Alberto Madrid, Soila Tello [address]    Group Therapy Note    Attendees: 5    Patient shared and set a goal at the beginning of group to practice a new way of being in group today. Notes:  Pt set goal to [employer] and out of my head\" and \"listen to others today and allow silence\". Pt appeared to meet goal as she engaged with peers by listening and offering feedback. Status After Intervention:  Improved    Participation Level:  Active Listener and Interactive    Participation Quality: Appropriate      Speech:  normal      Thought Process/Content: Logical  Linear      Affective Functioning: Flat and Constricted/Restricted      Mood: anxious and depressed      Level of consciousness:  Alert and Oriented x4      Response to Learning: Able to verbalize current knowledge/experience, Able to verbalize/acknowledge new learning and Able to retain information      Endings: None Reported    Modes of Intervention: Education, Support, Socialization, Exploration, Clarifying and Problem-solving      Discipline Responsible: /Counselor      Signature:  Alberto Madrid, CHARO-S, R-ENZO

## 2020-10-30 ENCOUNTER — HOSPITAL ENCOUNTER (OUTPATIENT)
Dept: PSYCHIATRY | Age: 45
Setting detail: THERAPIES SERIES
Discharge: HOME | End: 2020-10-30
Payer: COMMERCIAL

## 2020-10-30 VITALS — TEMPERATURE: 97.1 F

## 2020-10-30 PROCEDURE — 90853 GROUP PSYCHOTHERAPY: CPT

## 2020-10-30 NOTE — GROUP NOTE
Group Therapy Note    Date: 10/30/2020    Group Start Time:  1:35 PM  Group End Time:  2:45 PM  Group Topic: Psychotherapy    [facility]    JIM Del Valle, LICYONY, LSW      Group Therapy Note    Attendees: 5         Patient's Goal:  Explore relationship dynamics and effects of mental health on relationships. Learn healthy communication skills and how to use them in relationships with others. Notes:  Pt shared her frustrations with her  not wanting to work full time and the added stress that puts on her, challenging her ability to cope with emotions at times. Pt reported that she has a new job at [employer] and is excited. Pt shared skills she has put in place already with  to indicate she has met her limit and needs time to self sooth. Status After Intervention:  Improved    Participation Level:  Active Listener and Interactive    Participation Quality: Appropriate, Attentive, Sharing and Supportive      Speech:  normal      Thought Process/Content: Logical      Affective Functioning: Congruent      Mood: elevated      Level of consciousness:  Alert, Oriented x4 and Attentive      Response to Learning: Able to verbalize current knowledge/experience, Able to verbalize/acknowledge new learning, Able to retain information, Capable of insight, Able to change behavior and Progressing to goal      Endings: None Reported    Modes of Intervention: Education, Support, Socialization, Exploration, Clarifying, Problem-solving, Confrontation, Limit-setting and Reality-testing      Discipline Responsible: /Counselor      Signature:  JIM Del Valle [address]

## 2020-10-30 NOTE — GROUP NOTE
Group Therapy Note    Date: 10/30/2020    Group Start Time:  3:15 PM  Group End Time:  4:15 PM  Group Topic: Music Therapy    [facility]    Jeremy Soliman        Group Therapy Note    Music therapy group consisted of ez analysis intervention and verbal processing of dreams throughout life. Patients discussed lyrics to song \"Dream\" by Ki Seaman, used lyrics to relate to self - dreams and goals throughout life. Patients then engaged in creative visual art activity, expressing dreams from childhood, current goals in treatment, and a goal for reflection in end-of life. Attendees: 5         Notes:  Pt explored goals and dreams throughout life. Described music as childhood dream, set current goal for self as owning a mustang by age 47, and to remember happiness throughout life. Status After Intervention:  Improved    Participation Level:  Active Listener    Participation Quality: Attentive, Sharing and Supportive      Speech: Normal      Thought Process/Content: Logical      Affective Functioning: Congruent      Mood: euthymic      Level of consciousness:  Alert and Attentive      Response to Learning: Capable of insight and Progressing to goal      Endings: None Reported    Modes of Intervention: Socialization, Exploration, Activity and Media      Discipline Responsible: Psychoeducational Specialist      Signature:  Robert Munoz, MM, MT-BC

## 2020-10-30 NOTE — GROUP NOTE
Group Therapy Note    Date: 10/30/2020    Group Start Time:  2:00 PM  Group End Time:  3:00 PM  Group Topic: Psychoeducation    [facility]    Bibi Calderon, ATR        Group Therapy Note    Attendees: 5         Patient's Goal:  Patients were provided with information on the role self-talk, the amygdala, the frontal lobe, and the reticular activating system play in shaping our thinking and behavior. As a group and individually, patients were invited to identify the stories they tell themselves and to challenge any negative thinking that was limiting their progress and recovery. Notes:  Dio Huizar appeared receptive to information given on how thinking is shaped, participated in group discussion, and engaged in writing activity to challenge negative thinking. Status After Intervention:  Unchanged    Participation Level:  Active Listener and Interactive    Participation Quality: Appropriate, Attentive and Sharing      Speech:  pressured      Thought Process/Content: Flight of ideas      Affective Functioning: Blunted      Mood: anxious      Level of consciousness:  Alert, Oriented x4 and Preoccupied      Response to Learning: Able to verbalize current knowledge/experience, Able to verbalize/acknowledge new learning, and Able to retain information     Endings: None Reported    Modes of Intervention: Education, Support, Socialization, Exploration, Clarifying, Problem-solving and Activity      Discipline Responsible: Psychoeducational Specialist      Signature:  Lorna Moses

## 2020-11-02 ENCOUNTER — HOSPITAL ENCOUNTER (OUTPATIENT)
Dept: PSYCHIATRY | Age: 45
Setting detail: THERAPIES SERIES
Discharge: HOME | End: 2020-11-02
Payer: COMMERCIAL

## 2020-11-02 VITALS — TEMPERATURE: 96.9 F

## 2020-11-02 PROCEDURE — 90853 GROUP PSYCHOTHERAPY: CPT

## 2020-11-02 PROCEDURE — 99215 OFFICE O/P EST HI 40 MIN: CPT | Performed by: PSYCHIATRY & NEUROLOGY

## 2020-11-02 RX ORDER — ARIPIPRAZOLE 5 MG/1
5 TABLET ORAL DAILY
Qty: 30 TABLET | Refills: 1 | Status: SHIPPED | OUTPATIENT
Start: 2020-11-02 | End: 2021-07-04 | Stop reason: ALTCHOICE

## 2020-11-02 RX ORDER — ARIPIPRAZOLE 5 MG/1
5 TABLET ORAL EVERY EVENING
Qty: 30 TABLET | Refills: 0 | Status: SHIPPED | OUTPATIENT
Start: 2020-11-02 | End: 2020-11-02 | Stop reason: HOSPADM

## 2020-11-02 NOTE — GROUP NOTE
Group Therapy Note    Date: 11/2/2020    Group Start Time:  2:00 PM  Group End Time:  3:00 PM  Group Topic: Art Therapy     [facility] TRENTON Calderon, ATR        Group Therapy Note    Attendees: 5         Patient's Goal:  Patients were invited to participate in an Art Therapy activity on peer support and positive affirmations. Patients were invited to create a token for their fellow group members with what they wanted to give to each person and why, such as resilience, growth, or strength. At the end of group, patients were invited to read and share the tokens they had created for one another. Notes:  Virginia Hollins created tokens for peers, shared tokens with others, and appeared receptive of the tokens given to her by peers. Status After Intervention:  Improved    Participation Level:  Active Listener and Interactive    Participation Quality: Appropriate, Attentive, Sharing and Supportive      Speech:  normal      Thought Process/Content: Logical      Affective Functioning: Congruent      Mood: anxious      Level of consciousness:  Alert, Oriented x4 and Attentive      Response to Learning: Able to verbalize current knowledge/experience, Able to verbalize/acknowledge new learning, Able to retain information and Capable of insight      Endings: None Reported    Modes of Intervention: Education, Support, Socialization, Exploration, Problem-solving, Activity and Media      Discipline Responsible: Psychoeducational Specialist      Signature:  Bibi 426Lorna Lima

## 2020-11-02 NOTE — DISCHARGE SUMMARY
Discharge Summary   Admit Date: 10/7/2020   Discharge Date:    2020   Condition at [facility]  Spent over 40 minutes with patient and staff on [address] with more than 50 % of time spent with patient discussing care  Final Dx: axis I:  Mood DO unspecified  Axis 2: Histrionic Personality  Disorder   [place] 3: See Medical History     Axis 4: Problems related to the social environment  Axis 5:  On Admission: 41-50 serious symptoms At Discharge: 61-70 mild symptoms   All conditions on Axis 1 and Axis 2 and active problems on Axis 3 were treated while patient was hospitalized. (There are no active hospital problems to display for this patient.  )   Condition on DC  Mood and affect are stable and pt is not suicidal   VITALS:  There were no vitals taken for this visit. Brief Summary Present Illness   CHIEF COMPLAINT:  Depression and anxiety.     HISTORY OF PRESENT ILLNESS:  The patient is a 50-year-old female, who  presented to the ED at [facility] on 10/01/2020 with feelings of  stress. She reports having 30 years of anxiety and depression, but it  is intensified after her grandmother  a few weeks ago. This is a  big stressor. Grandmother had lived with her for approximately a month  prior to her death. She is also offered for a job at [employer] temporarily  and feels like her employers are not nice to her during this time. She  states that she is feeling \"manic. \"  She states she cannot control her  impulses. She states that she is angry with people that used to help  with her grandmother and is having ongoing conflicts with family. She  stated that she is primarily \"exhausted,\" gutiérrez, impatient, and  depressed. She states sleep is difficult, and she uses melatonin and  medical marijuana. Her speech is pressured at times, and family  describes mood lability. She is very tearful when she talked about her  grandmother's death a few weeks ago.   Grandmother had moved in with her  1 to 2 months prior to her death.  She also describes that she is  currently living in her father-in-law's basement with her . They  are there to help him, as he is elderly and needs assistance. She  stated that they have a trailer that they are paying for, and they  cannot live in both places and that is another added stress.     She stated that she also has problems with obsessive compulsive disorder  because she does not like bedbugs. She is trying to find out what is  wrong with her [employer]. \"  She is very dramatic, talkative, and engaged.  [facility] Course  Patient stabilized on Abilify 5 mg QD and Zoloft 50 mg qd and milieu treatment. Patient was discharged to home to continue recovery in the community. PE: (reviewed) and labs (see medical H&PE)  Labs:    No visits with results within 2 Day(s) from this visit.    Latest known visit with results is:   Admission on 10/01/2020, Discharged on 10/01/2020   Component Date Value Ref Range Status    Acetaminophen Level 10/01/2020 <5* 10 - 30 ug/mL Final    Comment: Therapeutic Range: 10.0-30.0 ug/mL  Toxic: >=150 ug/mL      WBC 10/01/2020 8.3  4.0 - 11.0 K/uL Final    RBC 10/01/2020 4.30  4.00 - 5.20 M/uL Final    Hemoglobin 10/01/2020 13.6  12.0 - 16.0 g/dL Final    Hematocrit 10/01/2020 41.1  36.0 - 48.0 % Final    MCV 10/01/2020 95.6  80.0 - 100.0 fL Final    MCH 10/01/2020 31.5  26.0 - 34.0 pg Final    MCHC 10/01/2020 33.0  31.0 - 36.0 g/dL Final    RDW 10/01/2020 13.5  12.4 - 15.4 % Final    Platelets 26/33/0231 313  135 - 450 K/uL Final    MPV 10/01/2020 7.4  5.0 - 10.5 fL Final    Neutrophils % 10/01/2020 65.6  % Final    Lymphocytes % 10/01/2020 25.3  % Final    Monocytes % 10/01/2020 7.1  % Final    Eosinophils % 10/01/2020 1.6  % Final    Basophils % 10/01/2020 0.4  % Final    Neutrophils Absolute 10/01/2020 5.4  1.7 - 7.7 K/uL Final    Lymphocytes Absolute 10/01/2020 2.1  1.0 - 5.1 K/uL Final    Monocytes Absolute 10/01/2020 0.6  0.0 - 1.3 K/uL Final  Eosinophils Absolute 10/01/2020 0.1  0.0 - 0.6 K/uL Final    Basophils Absolute 10/01/2020 0.0  0.0 - 0.2 K/uL Final    Sodium 10/01/2020 139  136 - 145 mmol/L Final    Potassium reflex Magnesium 10/01/2020 3.9  3.5 - 5.1 mmol/L Final    Chloride 10/01/2020 104  99 - 110 mmol/L Final    CO2 10/01/2020 28  21 - 32 mmol/L Final    Anion Gap 10/01/2020 7  3 - 16 Final    Glucose 10/01/2020 105* 70 - 99 mg/dL Final    BUN 10/01/2020 16  7 - 20 mg/dL Final    CREATININE 10/01/2020 <0.5* 0.6 - 1.1 mg/dL Final    GFR Non- 10/01/2020 >60  >60 Final    Comment: >60 mL/min/1.73m2 EGFR, calc. for ages 25 and older using the  MDRD formula (not corrected for weight), is valid for stable  renal function.  GFR  10/01/2020 >60  >60 Final    Comment: Chronic Kidney Disease: less than 60 ml/min/1.73 sq.m. Kidney Failure: less than 15 ml/min/1.73 sq.m. Results valid for patients 18 years and older.       Calcium 10/01/2020 9.1  8.3 - 10.6 mg/dL Final    Total Protein 10/01/2020 6.4  6.4 - 8.2 g/dL Final    Alb 10/01/2020 4.1  3.4 - 5.0 g/dL Final    Albumin/Globulin Ratio 10/01/2020 1.8  1.1 - 2.2 Final    Total Bilirubin 10/01/2020 0.6  0.0 - 1.0 mg/dL Final    Alkaline Phosphatase 10/01/2020 58  40 - 129 U/L Final    ALT 10/01/2020 6* 10 - 40 U/L Final    AST 10/01/2020 13* 15 - 37 U/L Final    Globulin 10/01/2020 2.3  g/dL Final    Ethanol Lvl 10/01/2020 None Detected  mg/dL Final    Comment:    None Detected  Conversion factor:  100 mg/dl = .100 g/dl  For Medical Purposes Only      Salicylate, Serum 22/95/2338 2.1* 15.0 - 30.0 mg/dL Final    Comment: Therapeutic Range: 15.0-30.0 mg/dL  Toxic: >30.0 mg/dL      Amphetamine Screen, Urine 10/01/2020 Neg  Negative <1000ng/mL Final    Barbiturate Screen, Ur 10/01/2020 Neg  Negative <200 ng/mL Final    Benzodiazepine Screen, Urine 10/01/2020 Neg  Negative <200 ng/mL Final    Cannabinoid Scrn, Ur 10/01/2020 POSITIVE* Negative <50 ng/mL Final    Cocaine Metabolite Screen, Urine 10/01/2020 Neg  Negative <300 ng/mL Final    Opiate Scrn, Ur 10/01/2020 Neg  Negative <300 ng/mL Final    Comment: \"Therapeutic levels of pain medication, especially oxycontin and synthetic  opioids, may not be detected by this Methodology. Pain management screen  panel  Drug panel-PM-Hi Res Ur, Interp (PAIN) should be considered for drug  monitoring \".  PCP Screen, Urine 10/01/2020 Neg  Negative <25 ng/mL Final    Methadone Screen, Urine 10/01/2020 Neg  Negative <300 ng/mL Final    Propoxyphene Scrn, Ur 10/01/2020 Neg  Negative <300 ng/mL Final    Oxycodone Urine 10/01/2020 Neg  Negative <100 ng/ml Final    pH, UA 10/01/2020 6.0   Final    Comment: Urine pH less than 5.0 or greater than 8.0 may indicate sample adulteration. Another sample should be collected if clinically  indicated.  Drug Screen Comment: 10/01/2020 see below   Final    Comment: This method is a screening test to detect only these drug  classes as part of a medical workup. Confirmatory testing  by another method should be ordered if clinically indicated.           Mental Status Exam at Discharge:  Level of consciousness:  awake  Appearance:  well-appearing, in chair, good grooming and good hygiene well-developed, well-nourished  Behavior/Motor:  no abnormalities noted normal gait and station AIMS: 0  Attitude toward examiner:  cooperative, attentive and good eye contact  Speech:  spontaneous, normal rate, normal volume and well articulated  Mood:  dysthymic  Affect:  mood congruent Anxiety: mild  Hallucinations: Absent  Thought processes:  coherent Attention span, Concentration & Attention:  attention span and concentration were age appropriate  Thought content:   no evidence of delusions OCD: none    Insight: normal insight and judgment Cognition:  oriented to person, place, and time  Fund of Knowledge: average  IQ:average Memory: intact  Suicide:  No specific plan to harm self  Sleep: sleeps through the night  Appetite: ok   Reassess Viktoria Risk:  no specific plan to harm self Pt has phone numbers to contact if suicidal thoughts recur and states pt will return to the hospital if suicidal feelings return.       Discharge Meds:    Zoloft 50 mg QD #30 1 refill  Abilify 5 mg Qd  #30 1 refill      Disposition - Residence Home    Follow Up:  See Discharge Instructions

## 2020-11-02 NOTE — GROUP NOTE
Group Therapy Note    Date: 11/2/2020    Group Start Time: 12:30 PM  Group End Time:  1:45 PM  Group Topic: Psychotherapy    [facility]    Jovan Aguayo, [facility]    Group Therapy Note    Attendees: 5    Patient shared and set a goal at the beginning of group to practice a new way of being in group today. Notes:  Pt was engaged in group and set goal to \"stay happy\". Pt was engaged in group discussion and processed with peers feeling sadness related to discharging from [facility] today. Status After Intervention:  Improved    Participation Level:  Active Listener and Interactive    Participation Quality: Appropriate      Speech:  normal      Thought Process/Content: Linear      Affective Functioning: Congruent      Mood: anxious and depressed      Level of consciousness:  Alert and Oriented x4      Response to Learning: Able to verbalize current knowledge/experience, Able to verbalize/acknowledge new learning and Able to retain information      Endings: None Reported    Modes of Intervention: Education, Support, Socialization, Exploration, Clarifying and Problem-solving      Discipline Responsible: /Counselor      Signature:  Jovan Aguayo, CHARO-S, R-ENZO

## 2020-11-02 NOTE — GROUP NOTE
Group Therapy Note    Date: 11/2/2020    Group Start Time:  3:15 PM  Group End Time:  4:15 PM  Group Topic: Music Therapy    [facility]        Group Therapy Note    Topic: Rapport Building & Structured Socialization    Per staff referral, therapy group focused on building group cohesion and peer support. Group members engaged in gameplay utilizing peer support and collaborative decision making, encouraging increased communication between group members. Attendees: 4         Notes: Active engagement in therapy session, appropriate peer interactions and collaboration. Status After Intervention:  Improved    Participation Level:  Active Listener    Participation Quality: Appropriate and Attentive      Speech:  normal      Thought Process/Content: Logical      Affective Functioning: Congruent      Mood: euthymic      Level of consciousness:  Alert      Response to Learning: Able to verbalize current knowledge/experience      Endings: None Reported    Modes of Intervention: Socialization, Exploration, Activity and Media      Discipline Responsible: Psychoeducational Specialist      Signature:  Sarahi Roman MM, MT-BC

## 2020-11-04 ENCOUNTER — HOSPITAL ENCOUNTER (OUTPATIENT)
Dept: GENERAL RADIOLOGY | Age: 45
Discharge: HOME | End: 2020-11-04
Payer: COMMERCIAL

## 2020-11-04 ENCOUNTER — HOSPITAL ENCOUNTER (OUTPATIENT)
Age: 45
Discharge: HOME | End: 2020-11-04
Payer: COMMERCIAL

## 2020-11-04 PROCEDURE — 72040 X-RAY EXAM NECK SPINE 2-3 VW: CPT

## 2020-11-04 PROCEDURE — 72070 X-RAY EXAM THORAC SPINE 2VWS: CPT

## 2021-03-26 ENCOUNTER — TELEPHONE (OUTPATIENT)
Dept: MAMMOGRAPHY | Age: 46
End: 2021-03-26

## 2021-03-26 ENCOUNTER — HOSPITAL ENCOUNTER (OUTPATIENT)
Dept: MAMMOGRAPHY | Age: 46
Discharge: HOME | End: 2021-03-26
Payer: COMMERCIAL

## 2021-03-26 DIAGNOSIS — Z12.39 SCREENING BREAST EXAMINATION: ICD-10-CM

## 2021-03-26 PROCEDURE — 77067 SCR MAMMO BI INCL CAD: CPT

## 2021-06-29 ENCOUNTER — APPOINTMENT (OUTPATIENT)
Dept: GENERAL RADIOLOGY | Age: 46
End: 2021-06-29
Payer: COMMERCIAL

## 2021-06-29 ENCOUNTER — HOSPITAL ENCOUNTER (EMERGENCY)
Age: 46
Discharge: HOME | End: 2021-06-29
Attending: EMERGENCY MEDICINE
Payer: COMMERCIAL

## 2021-06-29 VITALS
DIASTOLIC BLOOD PRESSURE: 64 MMHG | WEIGHT: 190 LBS | TEMPERATURE: 97.9 F | RESPIRATION RATE: 23 BRPM | BODY MASS INDEX: 30.53 KG/M2 | HEIGHT: 66 IN | SYSTOLIC BLOOD PRESSURE: 103 MMHG | OXYGEN SATURATION: 99 % | HEART RATE: 50 BPM

## 2021-06-29 DIAGNOSIS — I95.9 HYPOTENSION, UNSPECIFIED HYPOTENSION TYPE: Primary | ICD-10-CM

## 2021-06-29 LAB
A/G RATIO: 1.5 (ref 1.1–2.2)
ALBUMIN SERPL-MCNC: 3.7 G/DL (ref 3.4–5)
ALP BLD-CCNC: 87 U/L (ref 40–129)
ALT SERPL-CCNC: 11 U/L (ref 10–40)
ANION GAP SERPL CALCULATED.3IONS-SCNC: 9 MMOL/L (ref 3–16)
AST SERPL-CCNC: 13 U/L (ref 15–37)
BASOPHILS ABSOLUTE: 0 K/UL (ref 0–0.2)
BASOPHILS RELATIVE PERCENT: 0.5 %
BILIRUB SERPL-MCNC: 0.5 MG/DL (ref 0–1)
BILIRUBIN URINE: ABNORMAL
BLOOD, URINE: NEGATIVE
BUN BLDV-MCNC: 14 MG/DL (ref 7–20)
CALCIUM SERPL-MCNC: 9 MG/DL (ref 8.3–10.6)
CHLORIDE BLD-SCNC: 103 MMOL/L (ref 99–110)
CLARITY: CLEAR
CO2: 26 MMOL/L (ref 21–32)
COLOR: YELLOW
CREAT SERPL-MCNC: 0.6 MG/DL (ref 0.6–1.1)
EKG ATRIAL RATE: 54 BPM
EKG DIAGNOSIS: NORMAL
EKG P AXIS: 29 DEGREES
EKG P-R INTERVAL: 106 MS
EKG Q-T INTERVAL: 470 MS
EKG QRS DURATION: 86 MS
EKG QTC CALCULATION (BAZETT): 445 MS
EKG R AXIS: 46 DEGREES
EKG T AXIS: 24 DEGREES
EKG VENTRICULAR RATE: 54 BPM
EOSINOPHILS ABSOLUTE: 0.1 K/UL (ref 0–0.6)
EOSINOPHILS RELATIVE PERCENT: 1.2 %
GFR AFRICAN AMERICAN: >60
GFR NON-AFRICAN AMERICAN: >60
GLOBULIN: 2.5 G/DL
GLUCOSE BLD-MCNC: 98 MG/DL (ref 70–99)
GLUCOSE URINE: NEGATIVE MG/DL
HCT VFR BLD CALC: 41.7 % (ref 36–48)
HEMOGLOBIN: 13.8 G/DL (ref 12–16)
KETONES, URINE: ABNORMAL MG/DL
LACTIC ACID: 1.4 MMOL/L (ref 0.4–2)
LEUKOCYTE ESTERASE, URINE: NEGATIVE
LYMPHOCYTES ABSOLUTE: 1.2 K/UL (ref 1–5.1)
LYMPHOCYTES RELATIVE PERCENT: 12.5 %
MCH RBC QN AUTO: 31.1 PG (ref 26–34)
MCHC RBC AUTO-ENTMCNC: 33.1 G/DL (ref 31–36)
MCV RBC AUTO: 94.1 FL (ref 80–100)
MICROSCOPIC EXAMINATION: ABNORMAL
MONOCYTES ABSOLUTE: 0.6 K/UL (ref 0–1.3)
MONOCYTES RELATIVE PERCENT: 6.3 %
NEUTROPHILS ABSOLUTE: 7.7 K/UL (ref 1.7–7.7)
NEUTROPHILS RELATIVE PERCENT: 79.5 %
NITRITE, URINE: NEGATIVE
PDW BLD-RTO: 14.4 % (ref 12.4–15.4)
PH UA: 5.5 (ref 5–8)
PLATELET # BLD: 311 K/UL (ref 135–450)
PMV BLD AUTO: 7.4 FL (ref 5–10.5)
POTASSIUM REFLEX MAGNESIUM: 3.7 MMOL/L (ref 3.5–5.1)
PROTEIN UA: NEGATIVE MG/DL
RBC # BLD: 4.43 M/UL (ref 4–5.2)
SODIUM BLD-SCNC: 138 MMOL/L (ref 136–145)
SPECIFIC GRAVITY UA: >=1.03 (ref 1–1.03)
TOTAL PROTEIN: 6.2 G/DL (ref 6.4–8.2)
TROPONIN: <0.01 NG/ML
URINE REFLEX TO CULTURE: ABNORMAL
URINE TYPE: ABNORMAL
UROBILINOGEN, URINE: 0.2 E.U./DL
WBC # BLD: 9.7 K/UL (ref 4–11)

## 2021-06-29 PROCEDURE — 99284 EMERGENCY DEPT VISIT MOD MDM: CPT

## 2021-06-29 PROCEDURE — 93005 ELECTROCARDIOGRAM TRACING: CPT | Performed by: PHYSICIAN ASSISTANT

## 2021-06-29 PROCEDURE — 81003 URINALYSIS AUTO W/O SCOPE: CPT

## 2021-06-29 PROCEDURE — 85025 COMPLETE CBC W/AUTO DIFF WBC: CPT

## 2021-06-29 PROCEDURE — 83605 ASSAY OF LACTIC ACID: CPT

## 2021-06-29 PROCEDURE — 80053 COMPREHEN METABOLIC PANEL: CPT

## 2021-06-29 PROCEDURE — 93010 ELECTROCARDIOGRAM REPORT: CPT | Performed by: INTERNAL MEDICINE

## 2021-06-29 PROCEDURE — 71045 X-RAY EXAM CHEST 1 VIEW: CPT

## 2021-06-29 PROCEDURE — 2580000003 HC RX 258: Performed by: PHYSICIAN ASSISTANT

## 2021-06-29 PROCEDURE — 84484 ASSAY OF TROPONIN QUANT: CPT

## 2021-06-29 RX ORDER — 0.9 % SODIUM CHLORIDE 0.9 %
1000 INTRAVENOUS SOLUTION INTRAVENOUS ONCE
Status: COMPLETED | OUTPATIENT
Start: 2021-06-29 | End: 2021-06-29

## 2021-06-29 RX ADMIN — SODIUM CHLORIDE 1000 ML: 9 INJECTION, SOLUTION INTRAVENOUS at 14:28

## 2021-06-29 ASSESSMENT — ENCOUNTER SYMPTOMS
VOMITING: 0
SHORTNESS OF BREATH: 0
ABDOMINAL PAIN: 0

## 2021-06-29 NOTE — ED PROVIDER NOTES
[address] [facility]  EMERGENCY DEPARTMENT ENCOUNTER        Pt Name: Kalpana Thomas  MRN: [id number]  [place] 1975  Date of evaluation: 6/29/2021  Provider: Helene Siu PA-C  PCP: Matthew Da Silva MD    Shared Visit or Autonomous Visit:  I have seen and evaluated this patient with my supervising physician Kamaljit Chacon MD.    CHIEF COMPLAINT       Chief Complaint   Patient presents with    Other     reports at PCP, blood pressure low, has been off and on per patient PCP trying to find cause. 70's/60's today per patient       HISTORY OF PRESENT ILLNESS   (Location/Symptom, Timing/Onset, Context/Setting, Quality, Duration, Modifying Factors, Severity)  Note limiting factors. Kalpana Thomas is a 51 y.o. female presenting to the emergency department for evaluation of low blood pressure 55W and 52D systolic intermittent for the past 1 week was seen at PCP office today due to low blood pressure 88/60 sent to ER for evaluation possible dehydration. States not drinking as much as she should. No vomiting. She is had a few episodes of diarrhea and has had upset stomach and some cramps since she was started on new medication 2 weeks ago BuSpar Trileptal and trazodone. Intermittent lightheadedness no syncopal episodes. No chest pain or shortness of breath. Has had a little bit of a cough. No fever. States in the past she had been on prazosin for elevated blood pressure no longer on this  The history is provided by the patient. Nursing Notes were reviewed    REVIEW OF SYSTEMS    (2-9 systems for level 4, 10 or more for level 5)     Review of Systems   Constitutional: Negative for fever. Eyes: Negative for visual disturbance. Respiratory: Negative for shortness of breath. Cardiovascular: Negative for chest pain. Gastrointestinal: Negative for abdominal pain and vomiting. Neurological: Positive for light-headedness (intermittent).  Negative for dailyHistorical Med      lactase (LACTAID ULTRA) 9000 units TABS Take 9,000 Units by mouth onceHistorical Med      !! calcium carbonate (TUMS) 500 MG chewable tablet Take 1 tablet by mouth dailyHistorical Med      naproxen (NAPROSYN) 500 MG tablet Take 1 tablet by mouth 2 times daily (with meals), Disp-30 tablet, R-0Print      ferrous sulfate 325 (65 FE) MG tablet Take 325 mg by mouth daily (with breakfast) Every other dayHistorical Med      calcium citrate (CALCITRATE) 950 MG tablet Take 1 tablet by mouth daily. Multiple Vitamins-Minerals (THERAPEUTIC MULTIVITAMIN-MINERALS) tablet Take 1 tablet by mouth daily. ibuprofen (IBU) 600 MG tablet Take 1 tablet by mouth every 6 hours as needed for Pain for 20 doses. , Disp-20 tablet, R-0      Vitamin D (CHOLECALCIFEROL) 1000 UNITS CAPS capsule Take 50,000 Units by mouth daily 5 days of each weekHistorical Med      vitamin B-12 (CYANOCOBALAMIN) 100 MCG tablet Take 50 mcg by mouth daily 5 days a weekHistorical Med      Loratadine (CLARITIN) 10 MG CAPS Take  by mouth. !! - Potential duplicate medications found. Please discuss with provider.             ALLERGIES     Sulfa antibiotics, Toviaz [fesoterodine fumarate er], and Tape [adhesive tape]    FAMILYHISTORY       Family History   Problem Relation Age of Onset    Arthritis Mother     Depression Mother     High Blood Pressure Father     Depression Father     Depression Sister     Alcohol Abuse Maternal Grandfather     Alcohol Abuse Paternal Grandfather           SOCIAL HISTORY       Social History     Socioeconomic History    Marital status:      Spouse name: Diaz Chandra Number of children: 0    Years of education: some college, medical assistant    Highest education level: Not on file   Occupational History    Occupation: Human Resources     Comment: [employer]   Tobacco Use    Smoking status: Former Smoker     Packs/day: 1.00     Years: 10.00     Pack years: 10.00     Types: Cigarettes     Quit date: 2012     Years since quittin.1    Smokeless tobacco: Never Used    Tobacco comment: uses nicotine gum   Vaping Use    Vaping Use: Former   Substance and Sexual Activity    Alcohol use: No     Alcohol/week: 1.7 standard drinks     Types: 2 Standard drinks or equivalent per week     Comment: quit 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    Drug use: Yes     Frequency: 7.0 times per week     Types: Marijuana     Comment: takes 4 capsules full of liquid MJ    Sexual activity: Yes     Partners: Male   Other Topics Concern    Not on file   Social History Narrative    Not on file     Social Determinants of Health     Financial Resource Strain:     Difficulty of Paying Living Expenses:    Food Insecurity:     Worried About Running Out of Food in the Last Year:     Ran Out of Food in the Last Year:    Transportation Needs:     Lack of Transportation (Medical):      Lack of Transportation (Non-Medical):    Physical Activity:     Days of Exercise per Week:     Minutes of Exercise per Session:    Stress:     Feeling of Stress :    Social Connections:     Frequency of Communication with Friends and Family:     Frequency of Social Gatherings with Friends and Family:     Attends Hindu Services:     Active Member of Clubs or Organizations:     Attends Club or Organization Meetings:     Marital Status:    Intimate Partner Violence:     Fear of Current or Ex-Partner:     Emotionally Abused:     Physically Abused:     Sexually Abused:        SCREENINGS    [place] Coma Scale  Eye Opening: Spontaneous  Best Verbal Response: Oriented  Best Motor Response: Obeys commands  Eli Coma Scale Score: 15        PHYSICAL EXAM    (up to 7 for level 4, 8 or more for level 5)     ED Triage Vitals   BP Temp Temp Source Pulse Resp SpO2 Height Weight   21 1254 21 1254 21 1254 21 1254 21 1254 21 1254 21 1255 21 1255   97/60 97.9 °F (36.6 °C) Tympanic 60 16 100 % 5' 6\" (1.676 m) 190 lb (86.2 kg)       Physical Exam  Vitals and nursing note reviewed. Constitutional:       Appearance: She is well-developed. She is not toxic-appearing. HENT:      Head: Normocephalic and atraumatic. Mouth/Throat:      Mouth: Mucous membranes are moist.      Pharynx: Oropharynx is clear. No pharyngeal swelling, oropharyngeal exudate or posterior oropharyngeal erythema. Eyes:      Conjunctiva/sclera: Conjunctivae normal.      Pupils: Pupils are equal, round, and reactive to light. Neck:      Vascular: No JVD. Cardiovascular:      Rate and Rhythm: Normal rate and regular rhythm. Pulses: Normal pulses. Pulmonary:      Effort: Pulmonary effort is normal. No respiratory distress. Breath sounds: Normal breath sounds. No stridor. No wheezing, rhonchi or rales. Abdominal:      General: Bowel sounds are normal. There is no distension. Palpations: Abdomen is soft. Abdomen is not rigid. There is no mass. Tenderness: There is no abdominal tenderness. There is no guarding or rebound. Musculoskeletal:         General: Normal range of motion. Cervical back: Normal range of motion and neck supple. Skin:     General: Skin is warm and dry. Findings: No rash. Neurological:      Mental Status: She is alert and oriented to person, place, and time. GCS: GCS eye subscore is 4. GCS verbal subscore is 5. GCS motor subscore is 6. Cranial Nerves: No cranial nerve deficit. Sensory: Sensation is intact. No sensory deficit. Motor: Motor function is intact. No weakness (5/5 symmetric upper and lower extremities) or abnormal muscle tone. Coordination: Coordination is intact.  Coordination normal. Finger-Nose-Finger Test normal.   Psychiatric:         Behavior: Behavior normal.         DIAGNOSTIC RESULTS   LABS:    Labs Reviewed   COMPREHENSIVE METABOLIC PANEL W/ REFLEX TO MG FOR LOW K - Abnormal; Notable for the following components: Result Value    Total Protein 6.2 (*)     AST 13 (*)     All other components within normal limits    Narrative:     Performed at:  [facility],  [place], [place]   Phone [phone]   URINE RT REFLEX TO CULTURE - Abnormal; Notable for the following components:    Bilirubin Urine SMALL (*)     Ketones, Urine TRACE (*)     All other components within normal limits    Narrative:     Performed at:  [address] [address],  [place], [place]   Phone [phone]   CBC WITH AUTO DIFFERENTIAL    Narrative:     Performed at:  [address] [address],  [place], [place]   Phone [phone]   TROPONIN    Narrative:     Performed at:  [address] [address],  [place], [place]   Phone [phone]   LACTIC ACID, PLASMA    Narrative:     Performed at:  [address] [address],  [place]   Phone [phone]     Results for orders placed or performed during the hospital encounter of 06/29/21   CBC Auto Differential   Result Value Ref Range    WBC 9.7 4.0 - 11.0 K/uL    RBC 4.43 4.00 - 5.20 M/uL    Hemoglobin 13.8 12.0 - 16.0 g/dL    Hematocrit 41.7 36.0 - 48.0 %    MCV 94.1 80.0 - 100.0 fL    MCH 31.1 26.0 - 34.0 pg    MCHC 33.1 31.0 - 36.0 g/dL    RDW 14.4 12.4 - 15.4 %    Platelets 012 821 - 101 K/uL    MPV 7.4 5.0 - 10.5 fL    Neutrophils % 79.5 %    Lymphocytes % 12.5 %    Monocytes % 6.3 %    Eosinophils % 1.2 %    Basophils % 0.5 %    Neutrophils Absolute 7.7 1.7 - 7.7 K/uL    Lymphocytes Absolute 1.2 1.0 - 5.1 K/uL    Monocytes Absolute 0.6 0.0 - 1.3 K/uL    Eosinophils Absolute 0.1 0.0 - 0.6 K/uL    Basophils Absolute 0.0 0.0 - 0.2 K/uL   Comprehensive Metabolic Panel w/ Reflex to MG   Result Value Ref Range    Sodium 138 136 - 145 mmol/L    Potassium reflex Magnesium 3.7 3.5 - 5.1 mmol/L    Chloride 103 99 - 110 mmol/L    CO2 26 21 - 32 mmol/L    Anion Gap 9 3 - 16    Glucose 98 70 - 99 mg/dL    BUN 14 7 - 20 mg/dL    CREATININE 0.6 0.6 - 1.1 mg/dL    GFR Non-African American >60 >60    GFR African American >60 >60    Calcium 9.0 8.3 - 10.6 mg/dL    Total Protein 6.2 (L) 6.4 - 8.2 g/dL    Albumin 3.7 3.4 - 5.0 g/dL    Albumin/Globulin Ratio 1.5 1.1 - 2.2    Total Bilirubin 0.5 0.0 - 1.0 mg/dL    Alkaline Phosphatase 87 40 - 129 U/L    ALT 11 10 - 40 U/L    AST 13 (L) 15 - 37 U/L    Globulin 2.5 g/dL   Troponin   Result Value Ref Range    Troponin <0.01 <0.01 ng/mL   Urinalysis Reflex to Culture    Specimen: Urine, clean catch   Result Value Ref Range    Color, UA Yellow Straw/Yellow    Clarity, UA Clear Clear    Glucose, Ur Negative Negative mg/dL    Bilirubin Urine SMALL (A) Negative    Ketones, Urine TRACE (A) Negative mg/dL    Specific Gravity, UA >=1.030 1.005 - 1.030    Blood, Urine Negative Negative    pH, UA 5.5 5.0 - 8.0    Protein, UA Negative Negative mg/dL    Urobilinogen, Urine 0.2 <2.0 E.U./dL    Nitrite, Urine Negative Negative    Leukocyte Esterase, Urine Negative Negative    Microscopic Examination Not Indicated     Urine Type NotGiven     Urine Reflex to Culture Not Indicated    Lactic Acid, Plasma   Result Value Ref Range    Lactic Acid 1.4 0.4 - 2.0 mmol/L   EKG 12 Lead   Result Value Ref Range    Ventricular Rate 54 BPM    Atrial Rate 54 BPM    P-R Interval 106 ms    QRS Duration 86 ms    Q-T Interval 470 ms    QTc Calculation (Bazett) 445 ms    P Axis 29 degrees    R Axis 46 degrees    T Axis 24 degrees    Diagnosis       Sinus bradycardia with short PRConfirmed by BERNIE Mix MD ([id number]) on 6/29/2021 3:36:20 PM       All other labs were within normal range or not returned as of this dictation. EKG: All EKG's are interpreted by the Emergency Department Physician in the absence of a cardiologist.  Please see their note for interpretation of EKG.       RADIOLOGY: Trileptal, trazodone. Sent in by PCP for evaluation. Patient states at times has had lightheadedness denies this now. Asymptomatic here. Significant bed overall well-appearing here. Work-up obtained. EKG sinus bradycardia short TX rate of 54 no significant change from previous. Troponin is normal. Blood counts normal. Electrolytes normal. Renal function and LFTs normal. Urinalysis negative for infection, as trace ketones, mildly dehydrated, she was given 1 L normal saline here. Chest x-ray no acute cardiopulmonary disease. Hypotension possibly medication related trazodone potentially could cause hypotension. Patient states she her sleeping is better and she really does not need trazodone anymore we discussed potentially stopping this. Discussed disposition patient stating she really does not want to stay in the hospital call out to her doctor. 4:44 PM EDT  Spoke with PCP discussed results and plan for discharge and stopping trazodone she is in agreement in agreement. States patient also has an appointment with psychiatry tomorrow and she can discuss this with them as well patient in agreement. Discharged with family in good condition. Last /64. I estimate there is LOW risk for  ACUTE CORONARY SYNDROME, THORACIC AORTIC DISSECTION, STROKE, TRANSIENT ISCHEMIC ATTACK, HEMORRHAGE, OR CARDIAC ARRHYTHMIA, or SEPSIS thus I consider the discharge disposition reasonable. FINAL IMPRESSION      1.  Hypotension, unspecified hypotension type          DISPOSITION/PLAN   DISPOSITION Decision to Discharge    PATIENT REFERREDTO:  Kelly Oliver, [address] [facility] [address]  [phone]    Call in 1 day      Your psychiatrist    In 1 day  as previously scheduled    [facility]  [address] [address]  [phone]    If symptoms worsen      DISCHARGE MEDICATIONS:  Discharge Medication List as of 6/29/2021  5:07 PM          DISCONTINUED MEDICATIONS:  Discharge Medication List as of 6/29/2021  5:07 PM                 (Please note that portions ofthis note were completed with a voice recognition program.  Efforts were made to edit the dictations but occasionally words are mis-transcribed.)    Helene Siu PA-C (electronically signed)            Koby Chaudhry PA-C  06/29/21 [id number]

## 2021-06-29 NOTE — ED PROVIDER NOTES
I independently examined and evaluated Car WagonerAlexander. In brief, patient presenting for evaluation of low blood pressures. She did recently start several new medications including trazodone. She states that the low blood pressures have been ongoing for the past week or so. Her PCP sent her here for further evaluation. She does endorse some intermittent lightheadedness when she goes to stand but otherwise no persistent lightheadedness or any syncope. .    Focused exam revealed patient is in no acute distress. She is awake and alert with GCS 15. No gross facial droop. Her speech is clear. .    The Ekg interpreted by me shows  sinus bradycardia, rate=54 with short OR interval  Axis is   Normal  QTc is  normal  Intervals and Durations are unremarkable. ST Segments: nonspecific changes  No significant change from prior EKG dated 10/11/19    Imaging:  XR CHEST PORTABLE    Result Date: 6/29/2021  EXAMINATION: ONE XRAY VIEW OF THE CHEST 6/29/2021 1:38 pm COMPARISON: 05/28/2019 HISTORY: ORDERING SYSTEM PROVIDED HISTORY: cough TECHNOLOGIST PROVIDED HISTORY: Reason for exam:->cough Reason for Exam: cough FINDINGS: The lungs are without acute focal process. There is no effusion or pneumothorax. The cardiomediastinal silhouette is stable. The osseous structures are stable. Prominent right cardiophrenic fat pad. No acute process. ED course: Patient presenting for evaluation of hypotension which after extensive history taking an attempt to identify cause, as well as work-up which is been generally unremarkable including no evidence of any ongoing infection or sepsis, is felt that possibly the trazodone could be causing the symptoms. She was given IV fluids while here with improvement of her blood pressures. She will be discontinuing the trazodone and following up closely with her PCP.   She very much wishes to be discharged home rather than admitted for further observation of her blood pressures which have been largely in the 90s, raiza to the 814 systolic range with the fluids, and given her current lack of persistent symptoms we will discharge her home with close follow-up. Strict return precautions discussed. All questions answered at time of discharge. All diagnostic, treatment, and disposition decisions were made by myself in conjunction with the advanced practice provider. For all further details of the patient's emergency department visit, please see the advanced practice provider's documentation. Comment: Please note this report has been produced using speech recognition software and may contain errors related to that system including errors in grammar, punctuation, and spelling, as well as words and phrases that may be inappropriate. If there are any questions or concerns please feel free to contact the dictating provider for clarification.         Isidro Montano MD  06/30/21 0253

## 2021-07-04 ENCOUNTER — HOSPITAL ENCOUNTER (EMERGENCY)
Age: 46
Discharge: HOME | End: 2021-07-04
Attending: EMERGENCY MEDICINE
Payer: COMMERCIAL

## 2021-07-04 VITALS
TEMPERATURE: 98 F | OXYGEN SATURATION: 98 % | HEART RATE: 55 BPM | DIASTOLIC BLOOD PRESSURE: 63 MMHG | RESPIRATION RATE: 16 BRPM | SYSTOLIC BLOOD PRESSURE: 115 MMHG

## 2021-07-04 DIAGNOSIS — T88.7XXA MEDICATION SIDE EFFECT: ICD-10-CM

## 2021-07-04 DIAGNOSIS — E87.6 HYPOKALEMIA: ICD-10-CM

## 2021-07-04 DIAGNOSIS — R10.13 DYSPEPSIA: ICD-10-CM

## 2021-07-04 DIAGNOSIS — R42 DIZZINESS: Primary | ICD-10-CM

## 2021-07-04 DIAGNOSIS — R11.0 NAUSEA: ICD-10-CM

## 2021-07-04 DIAGNOSIS — R19.5 LOOSE STOOLS: ICD-10-CM

## 2021-07-04 LAB
ALBUMIN SERPL-MCNC: 4 G/DL (ref 3.4–5)
ALP BLD-CCNC: 82 U/L (ref 40–129)
ALT SERPL-CCNC: 10 U/L (ref 10–40)
ANION GAP SERPL CALCULATED.3IONS-SCNC: 13 MMOL/L (ref 3–16)
AST SERPL-CCNC: 13 U/L (ref 15–37)
BASOPHILS ABSOLUTE: 0 K/UL (ref 0–0.2)
BASOPHILS RELATIVE PERCENT: 0.2 %
BILIRUB SERPL-MCNC: 0.5 MG/DL (ref 0–1)
BILIRUBIN DIRECT: <0.2 MG/DL (ref 0–0.3)
BILIRUBIN URINE: ABNORMAL
BILIRUBIN, INDIRECT: ABNORMAL MG/DL (ref 0–1)
BLOOD, URINE: NEGATIVE
BUN BLDV-MCNC: 12 MG/DL (ref 7–20)
CALCIUM SERPL-MCNC: 8.7 MG/DL (ref 8.3–10.6)
CHLORIDE BLD-SCNC: 100 MMOL/L (ref 99–110)
CLARITY: CLEAR
CO2: 25 MMOL/L (ref 21–32)
COLOR: YELLOW
CREAT SERPL-MCNC: 0.6 MG/DL (ref 0.6–1.1)
EOSINOPHILS ABSOLUTE: 0 K/UL (ref 0–0.6)
EOSINOPHILS RELATIVE PERCENT: 0.2 %
GFR AFRICAN AMERICAN: >60
GFR NON-AFRICAN AMERICAN: >60
GLUCOSE BLD-MCNC: 86 MG/DL (ref 70–99)
GLUCOSE URINE: NEGATIVE MG/DL
HCG QUALITATIVE: NEGATIVE
HCT VFR BLD CALC: 41.6 % (ref 36–48)
HEMOGLOBIN: 13.8 G/DL (ref 12–16)
KETONES, URINE: 40 MG/DL
LEUKOCYTE ESTERASE, URINE: NEGATIVE
LIPASE: 39 U/L (ref 13–60)
LYMPHOCYTES ABSOLUTE: 1.6 K/UL (ref 1–5.1)
LYMPHOCYTES RELATIVE PERCENT: 14.6 %
MAGNESIUM: 1.9 MG/DL (ref 1.8–2.4)
MCH RBC QN AUTO: 31.2 PG (ref 26–34)
MCHC RBC AUTO-ENTMCNC: 33.2 G/DL (ref 31–36)
MCV RBC AUTO: 93.7 FL (ref 80–100)
MICROSCOPIC EXAMINATION: ABNORMAL
MONOCYTES ABSOLUTE: 0.8 K/UL (ref 0–1.3)
MONOCYTES RELATIVE PERCENT: 7.3 %
NEUTROPHILS ABSOLUTE: 8.5 K/UL (ref 1.7–7.7)
NEUTROPHILS RELATIVE PERCENT: 77.7 %
NITRITE, URINE: NEGATIVE
PDW BLD-RTO: 14.2 % (ref 12.4–15.4)
PH UA: 6 (ref 5–8)
PLATELET # BLD: 339 K/UL (ref 135–450)
PMV BLD AUTO: 7.8 FL (ref 5–10.5)
POTASSIUM REFLEX MAGNESIUM: 3.1 MMOL/L (ref 3.5–5.1)
PROTEIN UA: NEGATIVE MG/DL
RBC # BLD: 4.44 M/UL (ref 4–5.2)
SODIUM BLD-SCNC: 138 MMOL/L (ref 136–145)
SPECIFIC GRAVITY UA: >=1.03 (ref 1–1.03)
TOTAL PROTEIN: 6.3 G/DL (ref 6.4–8.2)
TROPONIN: <0.01 NG/ML
URINE TYPE: ABNORMAL
UROBILINOGEN, URINE: 0.2 E.U./DL
WBC # BLD: 10.9 K/UL (ref 4–11)

## 2021-07-04 PROCEDURE — 84703 CHORIONIC GONADOTROPIN ASSAY: CPT

## 2021-07-04 PROCEDURE — 83690 ASSAY OF LIPASE: CPT

## 2021-07-04 PROCEDURE — 93005 ELECTROCARDIOGRAM TRACING: CPT | Performed by: EMERGENCY MEDICINE

## 2021-07-04 PROCEDURE — 99284 EMERGENCY DEPT VISIT MOD MDM: CPT

## 2021-07-04 PROCEDURE — 80076 HEPATIC FUNCTION PANEL: CPT

## 2021-07-04 PROCEDURE — 6360000002 HC RX W HCPCS: Performed by: EMERGENCY MEDICINE

## 2021-07-04 PROCEDURE — 81003 URINALYSIS AUTO W/O SCOPE: CPT

## 2021-07-04 PROCEDURE — 2580000003 HC RX 258: Performed by: EMERGENCY MEDICINE

## 2021-07-04 PROCEDURE — 96374 THER/PROPH/DIAG INJ IV PUSH: CPT

## 2021-07-04 PROCEDURE — 85025 COMPLETE CBC W/AUTO DIFF WBC: CPT

## 2021-07-04 PROCEDURE — 83735 ASSAY OF MAGNESIUM: CPT

## 2021-07-04 PROCEDURE — 84484 ASSAY OF TROPONIN QUANT: CPT

## 2021-07-04 PROCEDURE — 6370000000 HC RX 637 (ALT 250 FOR IP): Performed by: EMERGENCY MEDICINE

## 2021-07-04 PROCEDURE — 80048 BASIC METABOLIC PNL TOTAL CA: CPT

## 2021-07-04 RX ORDER — 0.9 % SODIUM CHLORIDE 0.9 %
1000 INTRAVENOUS SOLUTION INTRAVENOUS ONCE
Status: COMPLETED | OUTPATIENT
Start: 2021-07-04 | End: 2021-07-04

## 2021-07-04 RX ORDER — ONDANSETRON 2 MG/ML
4 INJECTION INTRAMUSCULAR; INTRAVENOUS EVERY 30 MIN PRN
Status: DISCONTINUED | OUTPATIENT
Start: 2021-07-04 | End: 2021-07-04 | Stop reason: HOSPADM

## 2021-07-04 RX ORDER — BUSPIRONE HYDROCHLORIDE 10 MG/1
10 TABLET ORAL 2 TIMES DAILY
COMMUNITY

## 2021-07-04 RX ORDER — ACETAMINOPHEN 325 MG/1
650 TABLET ORAL ONCE
Status: COMPLETED | OUTPATIENT
Start: 2021-07-04 | End: 2021-07-04

## 2021-07-04 RX ORDER — HYDROXYZINE HYDROCHLORIDE 25 MG/1
25 TABLET, FILM COATED ORAL 3 TIMES DAILY PRN
COMMUNITY
End: 2021-11-15 | Stop reason: ALTCHOICE

## 2021-07-04 RX ORDER — OXCARBAZEPINE 150 MG/1
150 TABLET, FILM COATED ORAL 2 TIMES DAILY
COMMUNITY
End: 2021-11-15 | Stop reason: ALTCHOICE

## 2021-07-04 RX ORDER — FLUOXETINE HYDROCHLORIDE 40 MG/1
40 CAPSULE ORAL DAILY
COMMUNITY
End: 2022-07-13

## 2021-07-04 RX ADMIN — POTASSIUM BICARBONATE 40 MEQ: 782 TABLET, EFFERVESCENT ORAL at 17:16

## 2021-07-04 RX ADMIN — ONDANSETRON HYDROCHLORIDE 4 MG: 2 INJECTION, SOLUTION INTRAMUSCULAR; INTRAVENOUS at 17:20

## 2021-07-04 RX ADMIN — ACETAMINOPHEN 650 MG: 325 TABLET ORAL at 17:17

## 2021-07-04 RX ADMIN — SODIUM CHLORIDE 1000 ML: 9 INJECTION, SOLUTION INTRAVENOUS at 17:21

## 2021-07-04 ASSESSMENT — ENCOUNTER SYMPTOMS
NAUSEA: 1
SHORTNESS OF BREATH: 0
COUGH: 0
VOMITING: 0
BACK PAIN: 0
SORE THROAT: 0
ABDOMINAL PAIN: 1
DIARRHEA: 1

## 2021-07-04 ASSESSMENT — PAIN SCALES - GENERAL
PAINLEVEL_OUTOF10: 3
PAINLEVEL_OUTOF10: 5
PAINLEVEL_OUTOF10: 5

## 2021-07-04 NOTE — ED PROVIDER NOTES
[address] ED  EMERGENCY DEPARTMENT ENCOUNTER      Pt Name: Edgar Lowe  MRN: [id number]  [place] 1975  Date of evaluation: 7/4/2021  Provider: Pelon Aranda MD    CHIEF COMPLAINT       Chief Complaint   Patient presents with    Fatigue     Hasn't felt well in a couple weeks.  Nausea     no vomiting, poor appetite, last ate 11am chicken noodle soup     Hypotension     70s at home today    Fall     fell due to weakness today, no injury or LOC    Diarrhea     x 3 days mild         HISTORY OF PRESENT ILLNESS   (Location/Symptom, Timing/Onset, Context/Setting, Quality, Duration, Modifying Factors, Severity)  Note limiting factors. Edgar Lowe is a 51 y.o. female who presents to the emergency department     Is a 58-year-old female with a past medical history of anxiety, depression, PTSD who presents with ongoing dizziness, headache, nausea, loose stools and low blood pressure. Patient reports that she was seen at [facility] 2 weeks ago and started on BuSpar and Trileptal for her anxiety and depression. She was seen in the emergency department on June 29 for low blood pressures and had pressures consistently in the 07Y systolic. Patient reports that she did not want to be admitted at that time and decided to go home. Since then she has continued to feel unwell. She reports dizziness that is worse upon standing but is present at rest as well. Has a headache in the front part of her head that she describes as an achiness and rates as a 5 out of 10. Reports intermittent pain in her right shoulder, chest pain that is consistent with her anxiety, nausea, intermittent left upper quadrant pain that feels like a burning sensation, and 2 episodes of loose stools per day. Denies any dysuria or hematuria. Patient called her primary care provider regarding ongoing symptoms and they encouraged her to come to the emergency department for reevaluation.   She also daily    BUSPIRONE (BUSPAR) 10 MG TABLET    Take 10 mg by mouth 2 times daily    CALCIUM CARBONATE (TUMS) 500 MG CHEWABLE TABLET    Take 1 tablet by mouth daily    CALCIUM CARBONATE 500 MG CHEW    Take 500 mg by mouth daily    CALCIUM CITRATE (CALCITRATE) 950 MG TABLET    Take 1 tablet by mouth daily. FERROUS SULFATE 325 (65 FE) MG TABLET    Take 325 mg by mouth daily (with breakfast) Every other day    FLUOXETINE (PROZAC) 40 MG CAPSULE    Take 40 mg by mouth daily    HYDROXYZINE (ATARAX) 25 MG TABLET    Take 25 mg by mouth 3 times daily as needed (anxiety)    IBUPROFEN (IBU) 600 MG TABLET    Take 1 tablet by mouth every 6 hours as needed for Pain for 20 doses. LACTASE (LACTAID ULTRA) 9000 UNITS TABS    Take 9,000 Units by mouth once    LORATADINE (CLARITIN) 10 MG CAPS    Take  by mouth. MULTIPLE VITAMINS-MINERALS (THERAPEUTIC MULTIVITAMIN-MINERALS) TABLET    Take 1 tablet by mouth daily.     NAPROXEN (NAPROSYN) 500 MG TABLET    Take 1 tablet by mouth 2 times daily (with meals)    OMEGA-3 FATTY ACIDS (FISH OIL) 500 MG CAPS    Take 500 mg by mouth daily (with breakfast)    OXCARBAZEPINE (TRILEPTAL) 150 MG TABLET    Take 150 mg by mouth 2 times daily    TURMERIC 500 MG CAPS    Take 250 mg by mouth 2 times daily    VITAMIN B-12 (CYANOCOBALAMIN) 100 MCG TABLET    Take 50 mcg by mouth daily 5 days a week    VITAMIN B-12 (CYANOCOBALAMIN) 100 MCG TABLET    Take 1 tablet by mouth once a week    VITAMIN D (CHOLECALCIFEROL) 1000 UNITS CAPS CAPSULE    Take 50,000 Units by mouth daily 5 days of each week    VITAMIN D (ERGOCALCIFEROL) 1.25 MG (16475 UT) CAPS CAPSULE    Take 1 capsule by mouth once a week       ALLERGIES     Sulfa antibiotics, Toviaz [fesoterodine fumarate er], and Tape [adhesive tape]    FAMILY HISTORY       Family History   Problem Relation Age of Onset    Arthritis Mother     Depression Mother     High Blood Pressure Father     Depression Father     Depression Sister     Alcohol Abuse Maternal Grandfather     Alcohol Abuse Paternal Grandfather           SOCIAL HISTORY       Social History     Socioeconomic History    Marital status:      Spouse name: Mariah Ramírez Number of children: 0    Years of education: some college, medical assistant    Highest education level: Not on file   Occupational History    Occupation: Human Resources     Comment: [employer] [address] [employer] Use    Smoking status: Former Smoker     Packs/day: 1.00     Years: 10.00     Pack years: 10.00     Types: Cigarettes     Quit date: 2012     Years since quittin.1    Smokeless tobacco: Never Used    Tobacco comment: uses nicotine gum   Vaping Use    Vaping Use: Former   Substance and Sexual Activity    Alcohol use: No     Alcohol/week: 1.7 standard drinks     Types: 2 Standard drinks or equivalent per week     Comment: quit 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    Drug use: Yes     Frequency: 7.0 times per week     Types: Marijuana     Comment: takes 4 capsules full of liquid MJ    Sexual activity: Yes     Partners: Male   Other Topics Concern    Not on file   Social History Narrative    Not on file     Social Determinants of Health     Financial Resource Strain:     Difficulty of Paying Living Expenses:    Food Insecurity:     Worried About Running Out of Food in the Last Year:     Ran Out of Food in the Last Year:    Transportation Needs:     Lack of Transportation (Medical):      Lack of Transportation (Non-Medical):    Physical Activity:     Days of Exercise per Week:     Minutes of Exercise per Session:    Stress:     Feeling of Stress :    Social Connections:     Frequency of Communication with Friends and Family:     Frequency of Social Gatherings with Friends and Family:     Attends Holiness Services:     Active Member of Clubs or Organizations:     Attends Club or Organization Meetings:     Marital Status:    Intimate Partner Violence:     Fear of Current or Ex-Partner:     Emotionally Abused:     Physically Abused:     Sexually Abused:        SCREENINGS    [place] Coma Scale  Eye Opening: Spontaneous  Best Verbal Response: Oriented  Best Motor Response: Obeys commands  [place] Coma Scale Score: 15          PHYSICAL EXAM    (up to 7 for level 4, 8 or more for level 5)     ED Triage Vitals [07/04/21 1524]   BP Temp Temp Source Pulse Resp SpO2 Height Weight   112/68 97.4 °F (36.3 °C) Oral 77 18 94 % -- --       Physical Exam  Vitals and nursing note reviewed. Constitutional:       General: She is not in acute distress. Appearance: Normal appearance. HENT:      Head: Normocephalic and atraumatic. Nose: Nose normal. No congestion. Mouth/Throat:      Mouth: Mucous membranes are moist.   Eyes:      Conjunctiva/sclera: Conjunctivae normal.   Cardiovascular:      Rate and Rhythm: Normal rate and regular rhythm. Pulses: Normal pulses. Heart sounds: Normal heart sounds. No murmur heard. Pulmonary:      Effort: Pulmonary effort is normal. No respiratory distress. Breath sounds: Normal breath sounds. Abdominal:      General: There is no distension. Palpations: Abdomen is soft. Tenderness: There is no abdominal tenderness. Musculoskeletal:         General: No swelling or deformity. Normal range of motion. Cervical back: Normal range of motion and neck supple. Skin:     General: Skin is warm and dry. Neurological:      General: No focal deficit present. Mental Status: She is alert and oriented to person, place, and time.          DIAGNOSTIC RESULTS     EKG: All EKG's are interpreted by the Emergency Department Physician who either signs or Co-signs this chart in the absence of a cardiologist.    Sinus bradycardia, rate 54, normal intervals, no STEMI, similar to previous EKG dated June 29, 2021    RADIOLOGY:     Interpretation per the Radiologist below, if available at the time of this note:    No orders to display         LABS:  Labs Reviewed URINALYSIS - Abnormal; Notable for the following components:       Result Value    Bilirubin Urine MODERATE (*)     Ketones, Urine 40 (*)     All other components within normal limits    Narrative:     Performed at:  [facility] [address],  [place], [place]   Phone [phone]   CBC WITH AUTO DIFFERENTIAL - Abnormal; Notable for the following components:    Neutrophils Absolute 8.5 (*)     All other components within normal limits    Narrative:     Performed at:  [facility] [address],  [place], [place]   Phone [phone]   BASIC METABOLIC PANEL W/ REFLEX TO MG FOR LOW K - Abnormal; Notable for the following components:    Potassium reflex Magnesium 3.1 (*)     All other components within normal limits    Narrative:     Performed at:  [facility],  [place], [place]   Phone [phone]   HEPATIC FUNCTION PANEL - Abnormal; Notable for the following components:     Total Protein 6.3 (*)     AST 13 (*)     All other components within normal limits    Narrative:     Performed at:  [facility] [address],  [place], [place]   Phone [phone]   LIPASE    Narrative:     Performed at:  [address] [address],  [place], [place]   Phone [phone]   TROPONIN    Narrative:     Performed at:  [address] [address],  [place], [place]   Phone [phone]   HCG, SERUM, QUALITATIVE    Narrative:     Performed at:  [facility] [address],  [place], [place]   Phone [phone]   MAGNESIUM    Narrative:     Performed at:  [facility],  [place], [place]   Phone [phone]       All other labs were within normal range or not returned as of this dictation. EMERGENCY DEPARTMENT COURSE and DIFFERENTIAL DIAGNOSIS/MDM:   Vitals:    Vitals:    07/04/21 1524 07/04/21 1632 07/04/21 1702 07/04/21 1732   BP: 112/68 108/68 109/66 104/61   Pulse: 77 72 59 63   Resp: 18 17 12 15   Temp: 97.4 °F (36.3 °C)      TempSrc: Oral      SpO2: 94% 98% 99% 99%       Patient evaluated and previous record reviewed. Patient presents with fatigue, nausea, concern for low blood pressures, diarrhea. Vital signs notable for blood pressure 112/68, pulse 77, afebrile. Physical exam as documented above. Orthostatic vital signs are negative. Lab work-up notable for hypokalemia but otherwise largely unremarkable. Patient given IV fluids and reevaluated. Patient resting comfortably on reevaluation. Discussed unremarkable work-up here with patient today. I have a strong suspicion that symptoms are related to the BuSpar and Trileptal the patient was started on 2 weeks ago as treatment for her anxiety and depression. Informed her that these medications can be dangerous to just abruptly stop and to continue taking them until she has a plan in place with her prescribing doctor to come off of them safely. Advised her of at home care instructions as well as return precautions. Patient discharged home. CONSULTS:  None    PROCEDURES:  Unless otherwise noted below, none     Procedures      FINAL IMPRESSION      1. Dizziness    2. Nausea    3. Dyspepsia    4. Loose stools    5. Hypokalemia    6. Medication side effect          DISPOSITION/PLAN   DISPOSITION        PATIENT REFERRED TO:  Josiane House MD  [facility]  [phone]    Call on 7/5/2021        DISCHARGE MEDICATIONS:  New Prescriptions    No medications on file     Controlled Substances Monitoring:     No flowsheet data found.     (Please note that portions of this note were completed with a voice recognition program.  Efforts were made to edit the dictations but occasionally words are mis-transcribed.)    Aleksandar Gonzalez MD (electronically signed)  Attending Emergency Physician           Felisa Hill MD  07/04/21 2526

## 2021-07-04 NOTE — ED NOTES
Laying - (1732) 101/54, HR 57  Sitting side of bed - (1734) 107/62, HR 66  Standing - (1737) 108/59, HR 70 *Felt little dizzy.      Basilio Castro  07/04/21 9539

## 2021-07-04 NOTE — ED NOTES
Discharge instructions reviewed with patient. Patient denies any questions or concerns at this time. Peripheral IV removed. Patient ambulatory out of emergency department.       Shorty Butt RN  07/04/21 3169

## 2021-07-05 LAB
EKG ATRIAL RATE: 54 BPM
EKG DIAGNOSIS: NORMAL
EKG P AXIS: 21 DEGREES
EKG P-R INTERVAL: 112 MS
EKG Q-T INTERVAL: 486 MS
EKG QRS DURATION: 80 MS
EKG QTC CALCULATION (BAZETT): 460 MS
EKG R AXIS: 21 DEGREES
EKG T AXIS: 9 DEGREES
EKG VENTRICULAR RATE: 54 BPM

## 2021-07-05 PROCEDURE — 93010 ELECTROCARDIOGRAM REPORT: CPT | Performed by: INTERNAL MEDICINE

## 2021-11-15 ENCOUNTER — HOSPITAL ENCOUNTER (OUTPATIENT)
Dept: GENERAL RADIOLOGY | Age: 46
Discharge: HOME | End: 2021-11-15
Payer: MEDICARE

## 2021-11-15 ENCOUNTER — HOSPITAL ENCOUNTER (OUTPATIENT)
Age: 46
Discharge: HOME | End: 2021-11-15
Payer: MEDICARE

## 2021-11-15 ENCOUNTER — OFFICE VISIT (OUTPATIENT)
Dept: FAMILY MEDICINE CLINIC | Age: 46
End: 2021-11-15
Payer: MEDICARE

## 2021-11-15 VITALS
DIASTOLIC BLOOD PRESSURE: 70 MMHG | OXYGEN SATURATION: 99 % | HEART RATE: 71 BPM | BODY MASS INDEX: 27.97 KG/M2 | WEIGHT: 174 LBS | SYSTOLIC BLOOD PRESSURE: 100 MMHG | HEIGHT: 66 IN

## 2021-11-15 DIAGNOSIS — M25.511 CHRONIC RIGHT SHOULDER PAIN: ICD-10-CM

## 2021-11-15 DIAGNOSIS — G89.29 CHRONIC RIGHT SHOULDER PAIN: ICD-10-CM

## 2021-11-15 DIAGNOSIS — Z76.89 ENCOUNTER TO ESTABLISH CARE: Primary | ICD-10-CM

## 2021-11-15 DIAGNOSIS — E53.8 VITAMIN B12 DEFICIENCY: ICD-10-CM

## 2021-11-15 DIAGNOSIS — Z98.84 HISTORY OF BARIATRIC SURGERY: ICD-10-CM

## 2021-11-15 DIAGNOSIS — F31.5 BIPOLAR DISORDER, CURRENT EPISODE DEPRESSED, SEVERE, WITH PSYCHOTIC FEATURES (HCC): ICD-10-CM

## 2021-11-15 DIAGNOSIS — Z12.11 COLON CANCER SCREENING: ICD-10-CM

## 2021-11-15 DIAGNOSIS — Z13.220 SCREENING CHOLESTEROL LEVEL: ICD-10-CM

## 2021-11-15 DIAGNOSIS — E55.9 VITAMIN D DEFICIENCY: ICD-10-CM

## 2021-11-15 PROBLEM — F31.9 AFFECTIVE PSYCHOSIS, BIPOLAR (HCC): Status: ACTIVE | Noted: 2020-10-07

## 2021-11-15 PROCEDURE — 99203 OFFICE O/P NEW LOW 30 MIN: CPT | Performed by: PHYSICIAN ASSISTANT

## 2021-11-15 PROCEDURE — 73030 X-RAY EXAM OF SHOULDER: CPT

## 2021-11-15 PROCEDURE — 1036F TOBACCO NON-USER: CPT | Performed by: PHYSICIAN ASSISTANT

## 2021-11-15 PROCEDURE — G8427 DOCREV CUR MEDS BY ELIG CLIN: HCPCS | Performed by: PHYSICIAN ASSISTANT

## 2021-11-15 PROCEDURE — G8482 FLU IMMUNIZE ORDER/ADMIN: HCPCS | Performed by: PHYSICIAN ASSISTANT

## 2021-11-15 PROCEDURE — 90674 CCIIV4 VAC NO PRSV 0.5 ML IM: CPT | Performed by: PHYSICIAN ASSISTANT

## 2021-11-15 PROCEDURE — G8419 CALC BMI OUT NRM PARAM NOF/U: HCPCS | Performed by: PHYSICIAN ASSISTANT

## 2021-11-15 PROCEDURE — 90471 IMMUNIZATION ADMIN: CPT | Performed by: PHYSICIAN ASSISTANT

## 2021-11-15 RX ORDER — DIVALPROEX SODIUM 500 MG/1
2000 TABLET, EXTENDED RELEASE ORAL NIGHTLY
COMMUNITY

## 2021-11-15 SDOH — ECONOMIC STABILITY: FOOD INSECURITY: WITHIN THE PAST 12 MONTHS, THE FOOD YOU BOUGHT JUST DIDN'T LAST AND YOU DIDN'T HAVE MONEY TO GET MORE.: NEVER TRUE

## 2021-11-15 SDOH — ECONOMIC STABILITY: FOOD INSECURITY: WITHIN THE PAST 12 MONTHS, YOU WORRIED THAT YOUR FOOD WOULD RUN OUT BEFORE YOU GOT MONEY TO BUY MORE.: NEVER TRUE

## 2021-11-15 ASSESSMENT — PATIENT HEALTH QUESTIONNAIRE - PHQ9
SUM OF ALL RESPONSES TO PHQ QUESTIONS 1-9: 1
1. LITTLE INTEREST OR PLEASURE IN DOING THINGS: 0
SUM OF ALL RESPONSES TO PHQ9 QUESTIONS 1 & 2: 1
2. FEELING DOWN, DEPRESSED OR HOPELESS: 1
SUM OF ALL RESPONSES TO PHQ QUESTIONS 1-9: 1
SUM OF ALL RESPONSES TO PHQ QUESTIONS 1-9: 1

## 2021-11-15 ASSESSMENT — ENCOUNTER SYMPTOMS
WHEEZING: 0
BACK PAIN: 0
CONSTIPATION: 0
ABDOMINAL PAIN: 0
BLOOD IN STOOL: 0
VOMITING: 0
SHORTNESS OF BREATH: 0
COLOR CHANGE: 0
NAUSEA: 0

## 2021-11-15 ASSESSMENT — SOCIAL DETERMINANTS OF HEALTH (SDOH): HOW HARD IS IT FOR YOU TO PAY FOR THE VERY BASICS LIKE FOOD, HOUSING, MEDICAL CARE, AND HEATING?: NOT HARD AT ALL

## 2021-11-15 NOTE — PROGRESS NOTES
[id number] [facility] (:  1975) is a 51 y.o. female,Established patient, here for evaluation of the following chief complaint(s):  New Patient (establish care ), Shoulder Pain (right, x's 5 months, no known injury ), and Other (requesting labs )         ASSESSMENT/PLAN:  1. Encounter to establish care  2. Chronic right shoulder pain  -     Lara Smalls MD, Orthopedic Surgery, [facility]  -     XR SHOULDER RIGHT (MIN 2 VIEWS); Future  -     Suspect bursitis. will get imaging and send to ortho  3. History of bariatric surgery  -     LIPID PANEL; Future  -     VITAMIN D 25 HYDROXY; Future  -     VITAMIN B12; Future  -     IRON AND TIBC; Future  -     COMPREHENSIVE METABOLIC PANEL; Future  -     CBC; Future  4. Bipolar disorder, current episode depressed, severe, with psychotic features ([facility] [address]       -   Pt to follow up with psychiatry once monthly. Uncontrolled currently  5. Colon cancer screening  -     Fit-DNA (Cologuard)  6. Screening cholesterol level  -     LIPID PANEL; Future  7. Vitamin D deficiency  -     VITAMIN D 25 HYDROXY; Future  8. Vitamin B12 deficiency  -     VITAMIN B12; Future      Return if symptoms worsen or fail to improve.          Subjective   SUBJECTIVE/OBJECTIVE:  HPI  The pt is here to establish care    Right shoulder pain:  Started more than 5 months ago  No known injury  Location: lateral aspect of shoulder, radiating towards the elbow  Quality: severe, shooting and burning sensation  Associated symptoms: occasional neck pain but not usually in conjunction with the arm pain, weakness  Denies: numbness, erythema warmth and swelling  Treatments tried: tylenol and ibuprofen without improvmeent    Mood:  Current medication: Prozac and Buspar  Bipolar disorder, anxiety and depression  Side effects:denies any side effects  Residual symptoms: teafulness, psychotic type symptoms  psychiatry hernán rose, seeing her once per month  Has not had any lab work done    Hx of gastric bypass surgery  Surgery was in 2008    Gynecologist: Dr. Wayne Rodriguez Agent  Review of Systems   Constitutional: Negative for activity change and appetite change. Eyes: Negative for visual disturbance. Respiratory: Negative for shortness of breath and wheezing. Cardiovascular: Negative for chest pain, palpitations and leg swelling. Gastrointestinal: Negative for abdominal pain, blood in stool, constipation, nausea and vomiting. Endocrine: Negative for cold intolerance, heat intolerance, polydipsia, polyphagia and polyuria. Genitourinary: Negative for difficulty urinating. Musculoskeletal: Positive for arthralgias. Negative for back pain, joint swelling, neck pain and neck stiffness. Skin: Negative for color change. Neurological: Negative for dizziness, light-headedness and headaches. Psychiatric/Behavioral: Positive for decreased concentration, hallucinations and sleep disturbance. Negative for agitation, behavioral problems, confusion, dysphoric mood, self-injury and suicidal ideas. The patient is nervous/anxious. The patient is not hyperactive. Objective   Physical Exam  Vitals reviewed. Constitutional:       Appearance: Normal appearance. She is normal weight. HENT:      Head: Normocephalic and atraumatic. Mouth/Throat:      Mouth: Mucous membranes are moist.   Eyes:      Extraocular Movements: Extraocular movements intact. Conjunctiva/sclera: Conjunctivae normal.      Pupils: Pupils are equal, round, and reactive to light. Neck:      Thyroid: No thyromegaly. Cardiovascular:      Rate and Rhythm: Normal rate and regular rhythm. Heart sounds: Normal heart sounds. Pulmonary:      Effort: Pulmonary effort is normal.      Breath sounds: Normal breath sounds. Abdominal:      General: Bowel sounds are normal.   Musculoskeletal:      Right shoulder: Tenderness, bony tenderness and crepitus present. No deformity. Decreased range of motion.  Decreased strength. Normal pulse. Left shoulder: Normal.      Right lower leg: No edema. Left lower leg: No edema. Neurological:      Mental Status: She is alert and oriented to person, place, and time. Cranial Nerves: No cranial nerve deficit. Psychiatric:         Attention and Perception: Attention and perception normal.         Mood and Affect: Mood is anxious. Affect is tearful. Speech: Speech normal.         Behavior: Behavior normal. Behavior is cooperative. Thought Content: Thought content normal.         Cognition and Memory: Cognition and memory normal.         Judgment: Judgment normal.              An electronic signature was used to authenticate this note.     --SKYE Liu

## 2021-11-16 ENCOUNTER — OFFICE VISIT (OUTPATIENT)
Dept: ORTHOPEDIC SURGERY | Age: 46
End: 2021-11-16
Payer: MEDICARE

## 2021-11-16 VITALS — BODY MASS INDEX: 27.97 KG/M2 | WEIGHT: 174 LBS | HEIGHT: 66 IN

## 2021-11-16 DIAGNOSIS — M19.011 ARTHRITIS OF RIGHT ACROMIOCLAVICULAR JOINT: ICD-10-CM

## 2021-11-16 DIAGNOSIS — M75.51 SUBACROMIAL BURSITIS OF RIGHT SHOULDER JOINT: Primary | ICD-10-CM

## 2021-11-16 DIAGNOSIS — M75.21 RIGHT BICIPITAL TENOSYNOVITIS: ICD-10-CM

## 2021-11-16 DIAGNOSIS — M25.511 ACUTE PAIN OF RIGHT SHOULDER: ICD-10-CM

## 2021-11-16 PROCEDURE — G8419 CALC BMI OUT NRM PARAM NOF/U: HCPCS | Performed by: ORTHOPAEDIC SURGERY

## 2021-11-16 PROCEDURE — 99203 OFFICE O/P NEW LOW 30 MIN: CPT | Performed by: ORTHOPAEDIC SURGERY

## 2021-11-16 PROCEDURE — G8427 DOCREV CUR MEDS BY ELIG CLIN: HCPCS | Performed by: ORTHOPAEDIC SURGERY

## 2021-11-16 PROCEDURE — G8482 FLU IMMUNIZE ORDER/ADMIN: HCPCS | Performed by: ORTHOPAEDIC SURGERY

## 2021-11-16 PROCEDURE — 1036F TOBACCO NON-USER: CPT | Performed by: ORTHOPAEDIC SURGERY

## 2021-11-16 NOTE — PROGRESS NOTES
WISDOM TOOTH EXTRACTION      WRIST FRACTURE SURGERY Right 10/22/2014    with [facility]        Allergies: Allergies   Allergen Reactions    Lactose Nausea Only    Sulfa Antibiotics     Toviaz [Fesoterodine Fumarate Er]     Tape [Adhesive Tape] Rash     And bandaids - can wear for short time         Medications:   Current Outpatient Medications:     cariprazine hcl (VRAYLAR) 1.5 MG capsule, Take 1.5 mg by mouth daily, Disp: , Rfl:     divalproex (DEPAKOTE ER) 500 MG extended release tablet, Take 2,000 mg by mouth nightly, Disp: , Rfl:     busPIRone (BUSPAR) 10 MG tablet, Take 10 mg by mouth 2 times daily, Disp: , Rfl:     FLUoxetine (PROZAC) 40 MG capsule, Take 40 mg by mouth daily, Disp: , Rfl:     vitamin D (ERGOCALCIFEROL) 1.25 MG (56245 UT) CAPS capsule, Take 1 capsule by mouth once a week, Disp: 4 capsule, Rfl: 0    vitamin B-12 (CYANOCOBALAMIN) 100 MCG tablet, Take 1 tablet by mouth once a week, Disp: 5 tablet, Rfl: 0    Ascorbic Acid (VITAMIN C) 250 MG tablet, Take 500 mg by mouth daily, Disp: , Rfl:     lactase (LACTAID ULTRA) 9000 units TABS, Take 9,000 Units by mouth once, Disp: , Rfl:     calcium carbonate (TUMS) 500 MG chewable tablet, Take 1 tablet by mouth daily, Disp: , Rfl:     ferrous sulfate 325 (65 FE) MG tablet, Take 325 mg by mouth daily (with breakfast) Every other day, Disp: , Rfl:     Multiple Vitamins-Minerals (THERAPEUTIC MULTIVITAMIN-MINERALS) tablet, Take 1 tablet by mouth daily. , Disp: , Rfl:     ibuprofen (IBU) 600 MG tablet, Take 1 tablet by mouth every 6 hours as needed for Pain for 20 doses. , Disp: 20 tablet, Rfl: 0    vitamin B-12 (CYANOCOBALAMIN) 100 MCG tablet, Take 50 mcg by mouth daily 5 days a week, Disp: , Rfl:     Loratadine (CLARITIN) 10 MG CAPS, Take  by mouth.  , Disp: , Rfl:      Social history: Denies IV drug use.     Social History     Socioeconomic History    Marital status:      Spouse name: Kyler Rico Number of children: 0    Years of education: Physically Abused: Not on file    Sexually Abused: Not on file   Housing Stability:     Unable to Pay for Housing in the Last Year: Not on file    Number of Places Lived in the Last Year: Not on file    Unstable Housing in the Last Year: Not on file     Tobacco use. Social History     Tobacco Use   Smoking Status Former Smoker    Packs/day: 1.00    Years: 10.00    Pack years: 10.00    Types: Cigarettes    Quit date: 2012    Years since quittin.4   Smokeless Tobacco Never Used   Tobacco Comment    uses nicotine gum     Employment: Noncontributory noncontributory    Workers compensation claim: Noncontributory    Review of systems: Patient denies any fevers chills chest pain shortness of breath nausea vomiting significant weight loss any change in voiding or bowel movements. Patient denies any significant numbness or tingling at baseline as it relates to this presenting symptom/chief complaint. The patient denies any significant problems with skin or any significant allergies. Physical examination:  Body mass index is 28.08 kg/m². AAOx3, NCAT  EOMI  MMM  RR  Unlabored breathing, no wheezing  Skin intact BUE and BLE, warm and moist  Bilateral upper extremity examination specific to subjective symptoms  Exam Right Shoulder                                                Active Range of Motion (FF/Abd/ER/IR)         170/170/60/T12  Passive Range of Motion (FF/Abd/ER/IR)      same  Trace   Neer,    trace Alatorre,      5/5   empty Can,          5/5 ER arm at the side,       5/5   belly Press,      5/5 bear Hug,       equivocal O'Briens,         grossly positive   TTP at Biceps Tendon Sheath,     positive Speed, positive Yergeson, trace   TTP AC Joint, trace cross arm adduction,                         Skin intact throughout  5/5 D B T G IO EPL  SILT Ax, R, U, M  +2 radial pulse    Diagnostic imaging:  MY READ:  4V right shoulder 811/15/21 and 1 view 2021: Negative fracture.   Positive acromioclavicular joint arthrosis. No gross glenohumeral arthrosis. Type I acromion. Positive potential impingement is appreciated on radiographic view obtained. Pertinent lab work: None     Diagnosis Orders   1. Acute pain of right shoulder  XR SHOULDER RIGHT 1 VW       Assessment and plan: 51 y.o. female with current subjective symptoms and physical exam findings with diagnostic imaging correlating to right shoulder bicipital tenosynovitis, subacromial bursitis and acromioclavicular joint arthritis. -Time of 16 minutes was spent coordinating and discussing the clinical findings, reviewing diagnostic imaging as indicated, coordinating care with prior notes review and current clinical encounter documentation as it pertains to the patient's presenting subjective symptoms and diagnoses. -I reviewed with the patient the imaging findings as well as clinical exam and  how it correlates to subjective symptoms.  -I had a pleasant discussion with the patient today. I reviewed with her that currently her clinical examination correlates primarily to bicipital tenosynovitis. I reviewed with her consideration for nonoperative versus operative measures. Currently she understands the risk and benefits and wished to pursue conservative care. -I recommended oral anti-inflammatory however she is unable to take this secondary to gastric bypass. I recommended Voltaren topical gel as needed OTC per bottle  -With regard to overall physical therapy, this is been recommended and referral has been placed for periscapular strengthening stretching to include pain modalities.  -Pending the results of above listed conservative care, consideration for right shoulder biceps tendon groove corticosteroid injection which can be provided in the office. She will contact the office in 4 to 6 weeks for consideration.   Pending the results of this is diagnostic and therapeutic, she may contact the office in the future for potential consideration for right shoulder acromioclavicular joint corticosteroid injection intra-articularly can be provided here in the office and also potential for subacromial corticosteroid injection given the above listed diagnoses.  -All questions answered to the patient's satisfaction and the patient expressed understanding and agreement with the above listed treatment plan  -Follow up in 4 to 6 weeks  -Thank you for the clinical consultation and allowing me to participate in the patient's care. Electronically signed by Laura Oliveira MD on 11/16/21 at 1:14 PM EST         Laura Oliveira MD       Orthopaedic Surgery-Sports Medicine        Disclaimer: This note was dictated with voice recognition software. Though review and correction are routinely performed, please contact the office/medical records for any errors requiring correction.

## 2021-12-09 ENCOUNTER — HOSPITAL ENCOUNTER (OUTPATIENT)
Dept: PHYSICAL THERAPY | Age: 46
Setting detail: THERAPIES SERIES
Discharge: HOME | End: 2021-12-09
Payer: MEDICARE

## 2021-12-09 NOTE — FLOWSHEET NOTE
[address] [facility], [address] [place] [address], [address] [place] [address] [address]  Phone: [phone]   Fax:     [phone]    Physical Therapy  Cancellation/No-show Note  Patient Name:  Khalif Oneal  :  1975   Date:  2021    Cancelled visits to date: 0  No-shows to date: 1    For today's appointment patient:  []  Cancelled  []  Rescheduled appointment  [x]  No-show     Reason given by patient:  []  Patient ill  []  Conflicting appointment  []  No transportation    []  Conflict with work  [x]  No reason given  []  Other:     Comments:      Phone call information:   [x]  Phone call made today to patient at [phone] am/pm at the number provided:      []  Patient answered, conversation as follows:    [x]  Patient did not answer, message left as follows: Attendance policy was reviewed and told to reschedule. []  Phone call not needed - pt contacted us to cancel and provided reason for cancellation.      Electronically signed by:  Elizabeth Anderson, PT, PT

## 2021-12-10 ENCOUNTER — HOSPITAL ENCOUNTER (OUTPATIENT)
Dept: PHYSICAL THERAPY | Age: 46
Setting detail: THERAPIES SERIES
Discharge: HOME | End: 2021-12-10
Payer: MEDICARE

## 2021-12-10 PROCEDURE — 97161 PT EVAL LOW COMPLEX 20 MIN: CPT

## 2021-12-10 PROCEDURE — 97112 NEUROMUSCULAR REEDUCATION: CPT

## 2021-12-10 PROCEDURE — 97110 THERAPEUTIC EXERCISES: CPT

## 2021-12-10 NOTE — PROGRESS NOTES
[address] [place] [address] [facility], [address], [address] [place] [address] [address]  Phone: [phone]   Fax: [phone]    Date: 12/10/2021          Patient Name; :  Philip Cornell; 1975   Dx: Diagnosis: M19.011 (ICD-10-CM) - Arthritis of right acromioclavicular [id number] (ICD-10-CM) - Subacromial bursitis of right shoulder joint      Physician: Referring Practitioner: Yamilet Monday        Total PT Visits:      Measures Previous Current   Pain (0-10)     Disability %     ROM               Strength                 Specific Functional Improvements & Impressions:      Plan & Recommendations:  [] Continue rehabilitation due to objective improvement and continued functional deficits with frequency and duration:  [] Progress toward  []GAP, []Work Conditioning, []Independent HEP   [] Discharge due to   [] All goals achieved, [] Maximized \"medical necessity\" [] No subjective or objective improvements      Electronically signed by:  Martha Mccarty, PT  Therapy Plan of Care Re-Certification  This patient has been re-evaluated for physical therapy services and for therapy to continue, Medicare, Medicaid and other insurances require periodic physician review of the treatment plan. Please review the above re-evaluation and verify that you agree with plan of care as established above by signing the attached document and return it to our office or note changes to established plan below  [] Follow treatment plan as above [] Discontinue physical therapy  [] Change plan to:                                 __________________________________________________    Physician Signature:____________________________________ Date:____________  By signing above, therapists plan is approved by physician    If you have any questions or concerns, please don't hesitate to call.   Thank you for your referral.

## 2021-12-10 NOTE — FLOWSHEET NOTE
NEEDED                                                                   Therapeutic Activity (02829)                                          [employer] access code: [email or web address]           Therapeutic Exercise and NMR EXR  [x] (09914) Provided verbal/tactile cueing for activities related to strengthening, flexibility, endurance, ROM  for improvements in scapular, scapulothoracic and UE control with self care, reaching, carrying, lifting, house/yardwork, driving/computer work.    [] (23271) Provided verbal/tactile cueing for activities related to improving balance, coordination, kinesthetic sense, posture, motor skill, proprioception  to assist with  scapular, scapulothoracic and UE control with self care, reaching, carrying, lifting, house/yardwork, driving/computer work. Therapeutic Activities:    [x] (81483 or 28060) Provided verbal/tactile cueing for activities related to improving balance, coordination, kinesthetic sense, posture, motor skill, proprioception and motor activation to allow for proper function of scapular, scapulothoracic and UE control with self care, carrying, lifting, driving/computer work.      Home Exercise Program:    [x] (93316) Reviewed/Progressed HEP activities related to strengthening, flexibility, endurance, ROM of scapular, scapulothoracic and UE control with self care, reaching, carrying, lifting, house/yardwork, driving/computer work  [] (98648) Reviewed/Progressed HEP activities related to improving balance, coordination, kinesthetic sense, posture, motor skill, proprioception of scapular, scapulothoracic and UE control with self care, reaching, carrying, lifting, house/yardwork, driving/computer work      Manual Treatments:  PROM / STM / Oscillations-Mobs:  G-I, II, III, IV (PA's, Inf., Post.)  [x] (64125) Provided manual therapy to mobilize soft tissue/joints of cervical/CT, scapular GHJ and UE for the purpose of modulating pain, promoting relaxation,  increasing ROM, reducing/eliminating soft tissue swelling/inflammation/restriction, improving soft tissue extensibility and allowing for proper ROM for normal function with self care, reaching, carrying, lifting, house/yardwork, driving/computer work    Modalities:     [x] GAME READY (VASO)- for significant edema, swelling, pain control. Charges:  Timed Code Treatment Minutes: 25   Total Treatment Minutes:  45   BWC:  TE TIME:  NMR TIME:  MANUAL TIME:  UNTIMED MINUTES:  Medicare Total:   15  10    20        [x] EVAL (LOW) 84666 (typically 20 minutes face-to-face)  [] EVAL (MOD) 19205 (typically 30 minutes face-to-face)  [] EVAL (HIGH) 56132 (typically 45 minutes face-to-face)  [] RE-EVAL     [x] FH(78335) x1     [] IONTO  [x] NMR (58413) x 1    [] VASO  [] Manual (99988) x     [] Other:  [] TA x      [] Mech Traction (02986)  [] ES(attended) (50315)      [] ES (un) (81624):    ASSESSMENT:  See eval      GOALS:   Patient stated goal: perform ADL's pain free     Therapist goals for Patient:   Short Term Goals: To be achieved in: 2 weeks  1. Independent in HEP and progression per patient tolerance, in order to prevent re-injury. [x] Progressing: [] Met: [] Not Met: [] Adjusted     2. Patient will have a decrease in pain to facilitate improvement in movement, function, and ADLs as indicated by Functional Deficits. [x] Progressing: [] Met: [] Not Met: [] Adjusted     Long Term Goals: To be achieved in: 12 weeks  1. Disability index score of 10% or less for the DASH to assist with reaching prior level of function. [x] Progressing: [] Met: [] Not Met: [] Adjusted     2. Patient will demonstrate increased AROM to WNL to allow for proper joint functioning as indicated by patients Functional Deficits. [x] Progressing: [] Met: [] Not Met: [] Adjusted     3. Patient will demonstrate an increase in Strength to 5/5 to allow for proper functional mobility as indicated by patients Functional Deficits.    [x] Progressing: [] Met: [] Not Met: [] Adjusted     4. Patient will return to all functional activities without increased symptoms or restriction. [x] Progressing: [] Met: [] Not Met: [] Adjusted     5. Pt will perform all ADL's pain-free (patient specific functional goal)    [x] Progressing: [] Met: [] Not Met: [] Adjusted        Overall Progression Towards Functional goals/ Treatment Progress Update:  [] Patient is progressing as expected towards functional goals listed. [] Progression is slowed due to complexities/Impairments listed. [] Progression has been slowed due to co-morbidities. [x] Plan just implemented, too soon to assess goals progression <30days   [] Goals require adjustment due to lack of progress  [] Patient is not progressing as expected and requires additional follow up with physician  [] Other    Prognosis for POC: [] Good [x] Fair  [] Poor      Patient requires continued skilled intervention: [x] Yes  [] No    Treatment/Activity Tolerance:  [x] Patient able to complete treatment  [] Patient limited by fatigue  [] Patient limited by pain    [] Patient limited by other medical complications  [] Other:       PLAN: See eval  [] Continue per plan of care [] Alter current plan (see comments above)  [x] Plan of care initiated [] Hold pending MD visit [] Discharge    Electronically signed by:  Kelli Pérez PT Beba Nolen, SPT    Note: If patient does not return for scheduled/ recommended follow up visits, this note will serve as a discharge from care along with most recent update on progress.

## 2021-12-10 NOTE — PLAN OF CARE
[facility], [address] [facility], [address]  Phone: [phone]   Fax:     [phone]                                                       Physical Therapy Certification    Dear Referring Practitioner: Renata Potter,    We had the pleasure of evaluating the following patient for physical therapy services at [address] [place] [address]. A summary of our findings can be found in the initial assessment below. This includes our plan of care. If you have any questions or concerns regarding these findings, please do not hesitate to contact me at the office phone number checked above.   Thank you for the referral.       Physician Signature:_______________________________Date:__________________  By signing above (or electronic signature), therapists plan is approved by physician      Patient: Renata Angulo   : 1975   MRN: [id number]  Referring Physician: Referring Practitioner: Renata Potter      Evaluation Date: 12/10/2021      Medical Diagnosis Information:  Diagnosis: M19.011 (ICD-10-CM) - Arthritis of right acromioclavicular [id number] (ICD-10-CM) - Subacromial bursitis of right shoulder joint   Treatment Diagnosis: R shoulder pain                                         Insurance information: PT Insurance Information: [place]    Precautions/ Contra-indications: none      C-SSRS Triggered by Intake questionnaire (Past 2 wk assessment):   [x] No, Questionnaire did not trigger screening.   [] Yes, Patient intake triggered further evaluation      [] C-SSRS Screening completed   [] PCP notified via Plan of Care  [] Emergency services notified     Latex Allergy:  [x]NO      []YES  Preferred Language for Healthcare:   [x]English       []other:    SUBJECTIVE: Patient stated complaint: Pt reports anterior shoulder pain that began insidiously 6 months ago for no apparent reason. She describes the pain as burning in the anterolateral shoulder that is worst w/ most activity involving the R arm, but especially w/ overhead lifting. Relevant Medical History: RA, OA   Functional Disability Index: DASH 34%    Pain Scale: 2-10/10  Easing factors: resting, tylenol   Provocative factors: overhead lifting, carrying, most arm motion      Type: []Constant   [x]Intermittent  []Radiating []Localized []other:     Numbness/Tingling: (+) R arm and hand     Occupation/School: not working     Living Status/Prior Level of Function: Independent with ADLs and IADLs    OBJECTIVE:     CERV ROM  WNL   Cervical Flexion     Cervical Extension     Cervical SB     Cervical rotation          ROM Left Right   Shoulder Flex  135   Shoulder Abd  135   Shoulder ER  C7   Shoulder IR  L5                   Strength  Left Right   Shoulder Flex  4/5 w/ pain   Shoulder Scap  4/5 w/ pain   Shoulder ER  4/5   Shoulder IR  4/5               Reflexes/Sensation:    [x]Dermatomes/Myotomes intact    [x]Reflexes equal and normal bilaterally   []Other:    Joint mobility: GHJ   [x]Normal    []Hypo   []Hyper    Palpation: tenderness over anterior shoulder     Functional Mobility/Transfers: N/A    Posture: rounded shoulders, forward head     Bandages/Dressings/Incisions: N/A    Gait: (include devices/WB status): WNL    Orthopedic Special Tests: (+) speeds (+) empty can                        [x] Patient history, allergies, meds reviewed. Medical chart reviewed. See intake form. Review Of Systems (ROS):  [x]Performed Review of systems (Integumentary, CardioPulmonary, Neurological) by intake and observation. Intake form has been scanned into medical record. Patient has been instructed to contact their primary care physician regarding ROS issues if not already being addressed at this time.       Co-morbidities/Complexities (which will affect course of rehabilitation):   []None           Arthritic conditions   [x]Rheumatoid arthritis (M05.9)  [x]Osteoarthritis (M19.91)   Cardiovascular conditions   []Hypertension (I10)  []Hyperlipidemia (E78.5)  []Angina pectoris (I20)  []Atherosclerosis (I70)   Musculoskeletal conditions   []Disc pathology   []Congenital spine pathologies   []Prior surgical intervention  []Osteoporosis (M81.8)  []Osteopenia (M85.8)   Endocrine conditions   []Hypothyroid (E03.9)  []Hyperthyroid Gastrointestinal conditions   []Constipation (S78.70)   Metabolic conditions   []Morbid obesity (E66.01)  []Diabetes type 1(E10.65) or 2 (E11.65)   []Neuropathy (G60.9)     Pulmonary conditions   []Asthma (J45)  []Coughing   []COPD (J44.9)   Psychological Disorders  []Anxiety (F41.9)  []Depression (F32.9)   []Other:   []Other:          Barriers to/and or personal factors that will affect rehab potential:              [x]Age  []Sex              [x]Motivation/Lack of Motivation                        []Co-Morbidities              []Cognitive Function, education/learning barriers              []Environmental, home barriers              []profession/work barriers  []past PT/medical experience  []other:  Justification:     Falls Risk Assessment (30 days):   [x] Falls Risk assessed and no intervention required.   [] Falls Risk assessed and Patient requires intervention due to being higher risk   TUG score (>12s at risk):     [] Falls education provided, including       G-Codes:       ASSESSMENT:   Functional Impairments   []Noted spinal or UE joint hypomobility   []Noted spinal or UE joint hypermobility   [x]Decreased UE functional ROM   [x]Decreased UE functional strength   []Abnormal reflexes/sensation/myotomal/dermatomal deficits   [x]Decreased RC/scapular/core strength and neuromuscular control   []other:      Functional Activity Limitations (from functional questionnaire and intake)   []Reduced ability to tolerate prolonged functional positions   []Reduced ability or difficulty with changes of positions or transfers between positions   []Reduced ability to maintain good posture and demonstrate good body mechanics with sitting, bending, and lifting   [] Reduced ability or tolerance with driving and/or computer work   [x]Reduced ability to sleep   [x]Reduced ability to perform lifting, reaching, carrying tasks   [x]Reduced ability to tolerate impact through UE   [x]Reduced ability to reach behind back   [x]Reduced ability to  or hold objects   [x]Reduced ability to throw or toss an object   []other:    Participation Restrictions   [x]Reduced participation in self care activities   [x]Reduced participation in home management activities   []Reduced participation in work activities   [x]Reduced participation in social activities. [x]Reduced participation in sport/recreation activities. Classification:   []Signs/symptoms consistent with post-surgical status including decreased ROM, strength and function.   []Signs/symptoms consistent with joint sprain/strain   [x]Signs/symptoms consistent with shoulder impingement   []Signs/symptoms consistent with shoulder/elbow/wrist tendinopathy   [x]Signs/symptoms consistent with Rotator cuff tear   []Signs/symptoms consistent with labral tear   []Signs/symptoms consistent with postural dysfunction    []Signs/symptoms consistent with Glenohumeral IR Deficit - <45 degrees   []Signs/symptoms consistent with facet dysfunction of cervical/thoracic spine    []Signs/symptoms consistent with pathology which may benefit from Dry needling     []other:     Prognosis/Rehab Potential:      []Excellent   []Good    [x]Fair   []Poor    Tolerance of evaluation/treatment:    []Excellent   [x]Good    []Fair   []Poor    Physical Therapy Evaluation Complexity Justification  [x] A history of present problem with:  [] no personal factors and/or comorbidities that impact the plan of care;  [x]1-2 personal factors and/or comorbidities that impact the plan of care  []3 personal factors and/or comorbidities that impact the plan of care  [x] An examination of body systems using standardized tests and measures addressing any of the following: body structures and functions (impairments), activity limitations, and/or participation restrictions;:  [] a total of 1-2 or more elements   [] a total of 3 or more elements   [x] a total of 4 or more elements   [x] A clinical presentation with:  [x] stable and/or uncomplicated characteristics   [] evolving clinical presentation with changing characteristics  [] unstable and unpredictable characteristics;   [x] Clinical decision making of [x] low, [] moderate, [] high complexity using standardized patient assessment instrument and/or measurable assessment of functional outcome. [x] EVAL (LOW) 82287 (typically 20 minutes face-to-face)  [] EVAL (MOD) 23658 (typically 30 minutes face-to-face)  [] EVAL (HIGH) 74318 (typically 45 minutes face-to-face)  [] RE-EVAL     PLAN:  Frequency/Duration:  2 days per week for 12 Weeks:  INTERVENTIONS:  [x] Therapeutic exercise including: strength training, ROM, for Upper extremity and core   [x]  NMR activation and proprioception for UE, scap and Core   [x] Manual therapy as indicated for shoulder, scapula and spine to include: Dry Needling/IASTM, STM, PROM, Gr I-IV mobilizations, manipulation. [x] Modalities as needed that may include: thermal agents, E-stim, Biofeedback, US, iontophoresis as indicated  [x] Patient education on joint protection, postural re-education, activity modification, progression of HEP. HEP instruction: Refer to [address] [place] [address] access code and exercises on the 1st visit treatment note    GOALS:  Patient stated goal: perform ADL's pain free     Therapist goals for Patient:   Short Term Goals: To be achieved in: 2 weeks  1. Independent in HEP and progression per patient tolerance, in order to prevent re-injury. [x] Progressing: [] Met: [] Not Met: [] Adjusted     2.  Patient will have a decrease in pain to facilitate improvement in movement, function, and ADLs as indicated by Functional Deficits. [x] Progressing: [] Met: [] Not Met: [] Adjusted     Long Term Goals: To be achieved in: 12 weeks  1. Disability index score of 10% or less for the DASH to assist with reaching prior level of function. [x] Progressing: [] Met: [] Not Met: [] Adjusted     2. Patient will demonstrate increased AROM to WNL to allow for proper joint functioning as indicated by patients Functional Deficits. [x] Progressing: [] Met: [] Not Met: [] Adjusted     3. Patient will demonstrate an increase in Strength to 5/5 to allow for proper functional mobility as indicated by patients Functional Deficits. [x] Progressing: [] Met: [] Not Met: [] Adjusted     4. Patient will return to all functional activities without increased symptoms or restriction. [x] Progressing: [] Met: [] Not Met: [] Adjusted     5.  Pt will perform all ADL's pain-free (patient specific functional goal)    [x] Progressing: [] Met: [] Not Met: [] Adjusted      Electronically signed by:  Rudy Conn, PT Mamta Okeefe, SPT

## 2021-12-17 ENCOUNTER — HOSPITAL ENCOUNTER (OUTPATIENT)
Dept: PHYSICAL THERAPY | Age: 46
Setting detail: THERAPIES SERIES
Discharge: HOME | End: 2021-12-17
Payer: MEDICARE

## 2021-12-17 PROCEDURE — 97110 THERAPEUTIC EXERCISES: CPT

## 2021-12-17 PROCEDURE — 97140 MANUAL THERAPY 1/> REGIONS: CPT

## 2021-12-17 PROCEDURE — 97112 NEUROMUSCULAR REEDUCATION: CPT

## 2021-12-17 NOTE — FLOWSHEET NOTE
[facility] [facility], [address] [place] [address] [address]  Phone: [phone]   Fax:     [phone]      Physical Therapy Treatment Note/ Progress Report:     Date:  2021    Patient Name:  Jonah Resendez    :  1975  MRN: [id number]  Restrictions/Precautions:    Medical/Treatment Diagnosis Information:  · Diagnosis: M19.011 (ICD-10-CM) - Arthritis of right acromioclavicular [id number] (ICD-10-CM) - Subacromial bursitis of right shoulder joint  · Treatment Diagnosis: R shoulder pain  Insurance/Certification information:  PT Insurance Information: [place]  Physician Information:  Referring Practitioner: Pablo Machado  Has the plan of care been signed (Y/N):        [x]  Yes  []  No     Date of Patient follow up with Physician: N/A    Is this a Progress Report:     []  Yes  [x]  No     If Yes:  Date Range for reporting period:  Beginnin/10/21 ------------ Endin/10/22    Progress report will be due (10 Rx or 30 days whichever is less):      Recertification will be due (POC Duration  / 90 days whichever is less): 3/10/22      Visit # Insurance Allowable Auth Required   In Person 2  []  Yes     []  No    Tele Health   []  Yes     []  No    Total 2       Functional Scale: DASH 34%   Date assessed:  12/10/21      Latex Allergy:  [x]NO      []YES  Preferred Language for Healthcare:   [x]English       []other:    Pain level:  2-10/10     SUBJECTIVE:  Pt reports her shoulder is feeling better since the IE. OBJECTIVE: Pt was visibly upset throughout the treatment and continued to say she was just emotional today and denied that she was in physical pain. Pt was given hotline information for suicide prevention and other topics.     Observation:    Test measurements:       RESTRICTIONS/PRECAUTIONS: none    Exercises/Interventions:   Therapeutic Ex (85586) Sets/sec Reps Notes/CUES HEP   TB rows 2 10 OTB    TB ext 2 10 OTB    TB T's 2 10 OTB    TB IR 2 10 OTB    sidelying ER 2 10 1#    Wall push up  2 10     Upper trap stretch  10\" 5                                        Manual Intervention (35650)                                                 NMR re-education (21348)   CUES NEEDED                                                                   Therapeutic Activity (17493)                                          [employer] access code: [email or web address]           Therapeutic Exercise and NMR EXR  [x] (49362) Provided verbal/tactile cueing for activities related to strengthening, flexibility, endurance, ROM  for improvements in scapular, scapulothoracic and UE control with self care, reaching, carrying, lifting, house/yardwork, driving/computer work.    [] (77370) Provided verbal/tactile cueing for activities related to improving balance, coordination, kinesthetic sense, posture, motor skill, proprioception  to assist with  scapular, scapulothoracic and UE control with self care, reaching, carrying, lifting, house/yardwork, driving/computer work. Therapeutic Activities:    [x] (63348 or 04109) Provided verbal/tactile cueing for activities related to improving balance, coordination, kinesthetic sense, posture, motor skill, proprioception and motor activation to allow for proper function of scapular, scapulothoracic and UE control with self care, carrying, lifting, driving/computer work.      Home Exercise Program:    [x] (03776) Reviewed/Progressed HEP activities related to strengthening, flexibility, endurance, ROM of scapular, scapulothoracic and UE control with self care, reaching, carrying, lifting, house/yardwork, driving/computer work  [] (39938) Reviewed/Progressed HEP activities related to improving balance, coordination, kinesthetic sense, posture, motor skill, proprioception of scapular, scapulothoracic and UE control with self care, reaching, carrying, lifting, house/yardwork, driving/computer work      Manual Treatments:  PROM / STM / Oscillations-Mobs:  G-I, II, III, IV (PA's, Inf., Post.)  [x] (23222) Provided manual therapy to mobilize soft tissue/joints of cervical/CT, scapular GHJ and UE for the purpose of modulating pain, promoting relaxation,  increasing ROM, reducing/eliminating soft tissue swelling/inflammation/restriction, improving soft tissue extensibility and allowing for proper ROM for normal function with self care, reaching, carrying, lifting, house/yardwork, driving/computer work    Modalities:     [x] GAME READY (VASO)- for significant edema, swelling, pain control. Charges:  Timed Code Treatment Minutes: 40   Total Treatment Minutes:  40   BWC:  TE TIME:  NMR TIME:  MANUAL TIME:  UNTIMED MINUTES:  Medicare Total:   20  10  10          [] EVAL (LOW) 19753 (typically 20 minutes face-to-face)  [] EVAL (MOD) 81660 (typically 30 minutes face-to-face)  [] EVAL (HIGH) 28751 (typically 45 minutes face-to-face)  [] RE-EVAL     [x] AF(16158) x1     [] IONTO  [x] NMR (49491) x 1    [] VASO  [x] Manual (00862) x 1    [] Other:  [] TA x      [] Mech Traction (33066)  [] ES(attended) (74564)      [] ES (un) (24744):    ASSESSMENT:  See eval      GOALS:   Patient stated goal: perform ADL's pain free     Therapist goals for Patient:   Short Term Goals: To be achieved in: 2 weeks  1. Independent in HEP and progression per patient tolerance, in order to prevent re-injury. [x] Progressing: [] Met: [] Not Met: [] Adjusted     2. Patient will have a decrease in pain to facilitate improvement in movement, function, and ADLs as indicated by Functional Deficits. [x] Progressing: [] Met: [] Not Met: [] Adjusted     Long Term Goals: To be achieved in: 12 weeks  1. Disability index score of 10% or less for the DASH to assist with reaching prior level of function. [x] Progressing: [] Met: [] Not Met: [] Adjusted     2.  Patient will demonstrate increased AROM to WNL to allow for proper joint functioning as indicated by patients Functional Deficits. [x] Progressing: [] Met: [] Not Met: [] Adjusted     3. Patient will demonstrate an increase in Strength to 5/5 to allow for proper functional mobility as indicated by patients Functional Deficits. [x] Progressing: [] Met: [] Not Met: [] Adjusted     4. Patient will return to all functional activities without increased symptoms or restriction. [x] Progressing: [] Met: [] Not Met: [] Adjusted     5. Pt will perform all ADL's pain-free (patient specific functional goal)    [x] Progressing: [] Met: [] Not Met: [] Adjusted        Overall Progression Towards Functional goals/ Treatment Progress Update:  [] Patient is progressing as expected towards functional goals listed. [] Progression is slowed due to complexities/Impairments listed. [] Progression has been slowed due to co-morbidities. [x] Plan just implemented, too soon to assess goals progression <30days   [] Goals require adjustment due to lack of progress  [] Patient is not progressing as expected and requires additional follow up with physician  [] Other    Prognosis for POC: [] Good [x] Fair  [] Poor      Patient requires continued skilled intervention: [x] Yes  [] No    Treatment/Activity Tolerance:  [x] Patient able to complete treatment  [] Patient limited by fatigue  [] Patient limited by pain    [] Patient limited by other medical complications  [] Other:       PLAN: See eval  [] Continue per plan of care [] Alter current plan (see comments above)  [x] Plan of care initiated [] Hold pending MD visit [] Discharge    Electronically signed by:  Jorge A Ring, PT ALEXI Awad    Note: If patient does not return for scheduled/ recommended follow up visits, this note will serve as a discharge from care along with most recent update on progress.

## 2021-12-20 ENCOUNTER — HOSPITAL ENCOUNTER (OUTPATIENT)
Dept: PHYSICAL THERAPY | Age: 46
Setting detail: THERAPIES SERIES
Discharge: HOME | End: 2021-12-20
Payer: MEDICARE

## 2021-12-20 NOTE — FLOWSHEET NOTE
[address] [facility], [address] [place] [address], [address] [place] [address] [address]  Phone: [phone]   Fax:     [phone]    Physical Therapy  Cancellation/No-show Note  Patient Name:  William Chavez  :  1975   Date:  2021    Cancelled visits to date: 1  No-shows to date: 1    For today's appointment patient:  [x]  Cancelled  []  Rescheduled appointment  []  No-show     Reason given by patient:  [x]  Patient ill  []  Conflicting appointment  []  No transportation    []  Conflict with work  []  No reason given  []  Other:     Comments:      Phone call information:   []  Phone call made today to patient at [phone] am/pm at the number provided:      []  Patient answered, conversation as follows:    [x]  Patient did not answer, message left as follows: Attendance policy was reviewed and told to reschedule. [x]  Phone call not needed - pt contacted us to cancel and provided reason for cancellation.      Electronically signed by:  Priscila Salvador, PT, PT

## 2021-12-27 ENCOUNTER — HOSPITAL ENCOUNTER (OUTPATIENT)
Dept: PHYSICAL THERAPY | Age: 46
Setting detail: THERAPIES SERIES
Discharge: HOME | End: 2021-12-27
Payer: MEDICARE

## 2021-12-27 PROCEDURE — 97140 MANUAL THERAPY 1/> REGIONS: CPT

## 2021-12-27 PROCEDURE — 97110 THERAPEUTIC EXERCISES: CPT

## 2021-12-27 PROCEDURE — 97112 NEUROMUSCULAR REEDUCATION: CPT

## 2021-12-27 NOTE — FLOWSHEET NOTE
[address] [place] [address] [facility], [address] [place] [address] [address]  Phone: [phone]   Fax:     [phone]      Physical Therapy Treatment Note/ Progress Report:     Date:  2021    Patient Name:  Percy Killian    :  1975  MRN: [id number]  Restrictions/Precautions:    Medical/Treatment Diagnosis Information:  · Diagnosis: M19.011 (ICD-10-CM) - Arthritis of right acromioclavicular [id number] (ICD-10-CM) - Subacromial bursitis of right shoulder joint  · Treatment Diagnosis: R shoulder pain  Insurance/Certification information:  PT Insurance Information: [place]  Physician Information:  Referring Practitioner: Olivia Myers  Has the plan of care been signed (Y/N):        [x]  Yes  []  No     Date of Patient follow up with Physician: N/A    Is this a Progress Report:     []  Yes  [x]  No     If Yes:  Date Range for reporting period:  Beginnin/10/21 ------------ Endin/10/22    Progress report will be due (10 Rx or 30 days whichever is less): 03     Recertification will be due (POC Duration  / 90 days whichever is less): 3/10/22      Visit # Insurance Allowable Auth Required   In Person 3  []  Yes     []  No    Tele Health   []  Yes     []  No    Total 3       Functional Scale: DASH 34%   Date assessed:  12/10/21      Latex Allergy:  [x]NO      []YES  Preferred Language for Healthcare:   [x]English       []other:    Pain level:  5/10     SUBJECTIVE:  Pt reports shoulder is sore when she raises it       OBJECTIVE:    Observation:    Test measurements:       AROM:  Flexion: 130  Abduction: 100  ER:  IR:          RESTRICTIONS/PRECAUTIONS: none    Exercises/Interventions:   Therapeutic Ex (51962) Sets/sec Reps Notes/CUES HEP   Pulleys 5'      Supine concentric circles 1 30      Supine cane flexion 2 10     SL Concentric circles 1 30 ea     TB rows 2 10 BTB    TB ext 2 10 BTB     2 10 OTB    TB IR 2 10 BTB    TB ER 2 10 GTB    2 10     10\" 5     Wall ball circles 1 15 Flex/abd, 2#    Saw ball 2 10     IR ball behind back 2 20 2#    ER ball behind head 2 10 2#    Bicep curls 3 10 3#                                Manual Intervention ([id number])       PROM into shoulder flexion, abduction, ER and IR in neutral, 45° and 90° abduction                                            NMR re-education (32832)   CUES NEEDED                                                                   Therapeutic Activity (68443)                                          [employer] access code: [email or web address]           Therapeutic Exercise and NMR EXR  [x] (55366) Provided verbal/tactile cueing for activities related to strengthening, flexibility, endurance, ROM  for improvements in scapular, scapulothoracic and UE control with self care, reaching, carrying, lifting, house/yardwork, driving/computer work.    [] (42412) Provided verbal/tactile cueing for activities related to improving balance, coordination, kinesthetic sense, posture, motor skill, proprioception  to assist with  scapular, scapulothoracic and UE control with self care, reaching, carrying, lifting, house/yardwork, driving/computer work. Therapeutic Activities:    [x] (42714 or 54988) Provided verbal/tactile cueing for activities related to improving balance, coordination, kinesthetic sense, posture, motor skill, proprioception and motor activation to allow for proper function of scapular, scapulothoracic and UE control with self care, carrying, lifting, driving/computer work.      Home Exercise Program:    [x] (51593) Reviewed/Progressed HEP activities related to strengthening, flexibility, endurance, ROM of scapular, scapulothoracic and UE control with self care, reaching, carrying, lifting, house/yardwork, driving/computer work  [] (80179) Reviewed/Progressed HEP activities related to improving balance, coordination, kinesthetic sense, posture, motor skill, proprioception of scapular, scapulothoracic and UE control with self care, reaching, carrying, lifting, house/yardwork, driving/computer work      Manual Treatments:  PROM / STM / Oscillations-Mobs:  G-I, II, III, IV (PA's, Inf., Post.)  [x] (74502) Provided manual therapy to mobilize soft tissue/joints of cervical/CT, scapular GHJ and UE for the purpose of modulating pain, promoting relaxation,  increasing ROM, reducing/eliminating soft tissue swelling/inflammation/restriction, improving soft tissue extensibility and allowing for proper ROM for normal function with self care, reaching, carrying, lifting, house/yardwork, driving/computer work    Modalities:     [x] GAME READY (VASO)- for significant edema, swelling, pain control. Charges:  Timed Code Treatment Minutes: 40   Total Treatment Minutes:  40   BWC:  TE TIME:  NMR TIME:  MANUAL TIME:  UNTIMED MINUTES:  Medicare Total:   20  10  10          [] EVAL (LOW) 04011 (typically 20 minutes face-to-face)  [] EVAL (MOD) 90157 (typically 30 minutes face-to-face)  [] EVAL (HIGH) 12872 (typically 45 minutes face-to-face)  [] RE-EVAL     [x] AU(01513) x1     [] IONTO  [x] NMR (84124) x 1    [] VASO  [x] Manual (79405) x 1    [] Other:  [] TA x      [] Mech Traction (73139)  [] ES(attended) (19595)      [] ES (un) (61051):    ASSESSMENT:  See eval      GOALS:   Patient stated goal: perform ADL's pain free     Therapist goals for Patient:   Short Term Goals: To be achieved in: 2 weeks  1. Independent in HEP and progression per patient tolerance, in order to prevent re-injury. [x] Progressing: [] Met: [] Not Met: [] Adjusted     2. Patient will have a decrease in pain to facilitate improvement in movement, function, and ADLs as indicated by Functional Deficits. [x] Progressing: [] Met: [] Not Met: [] Adjusted     Long Term Goals: To be achieved in: 12 weeks  1. Disability index score of 10% or less for the DASH to assist with reaching prior level of function.    [x] Progressing: [] Met: [] Not Met: [] Adjusted     2. Patient will demonstrate increased AROM to WNL to allow for proper joint functioning as indicated by patients Functional Deficits. [x] Progressing: [] Met: [] Not Met: [] Adjusted     3. Patient will demonstrate an increase in Strength to 5/5 to allow for proper functional mobility as indicated by patients Functional Deficits. [x] Progressing: [] Met: [] Not Met: [] Adjusted     4. Patient will return to all functional activities without increased symptoms or restriction. [x] Progressing: [] Met: [] Not Met: [] Adjusted     5. Pt will perform all ADL's pain-free (patient specific functional goal)    [x] Progressing: [] Met: [] Not Met: [] Adjusted        Overall Progression Towards Functional goals/ Treatment Progress Update:  [] Patient is progressing as expected towards functional goals listed. [] Progression is slowed due to complexities/Impairments listed. [] Progression has been slowed due to co-morbidities. [x] Plan just implemented, too soon to assess goals progression <30days   [] Goals require adjustment due to lack of progress  [] Patient is not progressing as expected and requires additional follow up with physician  [] Other    Prognosis for POC: [] Good [x] Fair  [] Poor      Patient requires continued skilled intervention: [x] Yes  [] No    Treatment/Activity Tolerance:  [x] Patient able to complete treatment  [] Patient limited by fatigue  [] Patient limited by pain    [] Patient limited by other medical complications  [] Other:       PLAN: See eval  [] Continue per plan of care [] Alter current plan (see comments above)  [x] Plan of care initiated [] Hold pending MD visit [] Discharge    Electronically signed by:  Kristie Dixon PT Bernarda Mathews, [facility]    Note: If patient does not return for scheduled/ recommended follow up visits, this note will serve as a discharge from care along with most recent update on progress.

## 2022-01-03 ENCOUNTER — HOSPITAL ENCOUNTER (OUTPATIENT)
Dept: PHYSICAL THERAPY | Age: 47
Setting detail: THERAPIES SERIES
Discharge: HOME | End: 2022-01-03
Payer: MEDICARE

## 2022-01-03 PROCEDURE — 97112 NEUROMUSCULAR REEDUCATION: CPT

## 2022-01-03 PROCEDURE — 97140 MANUAL THERAPY 1/> REGIONS: CPT

## 2022-01-03 PROCEDURE — 97110 THERAPEUTIC EXERCISES: CPT

## 2022-01-03 NOTE — FLOWSHEET NOTE
SL Concentric circles 1 30 ea     TB rows 2 10 BTB    TB ext 2 10 BTB     2 10 OTB    TB IR 2 10 BTB    TB ER 2 10 GTB    2 10     10\" 5     Wall ball circles 1 15 Flex/abd, 2#    Saw ball 2 10     IR ball behind back 2 20 2#    ER ball behind head 2 10 2#    Bicep curls 3 10 3#    SA punch 2 10 3#    SL ER 3 10 0#                  Manual Intervention ([id number])       PROM into shoulder flexion, abduction, ER and IR in neutral, 45° and 90° abduction   10                                         NMR re-education (13900)   CUES NEEDED                                                                   Therapeutic Activity (13014)                                          [employer] access code: [email or web address]           Therapeutic Exercise and NMR EXR  [x] (59399) Provided verbal/tactile cueing for activities related to strengthening, flexibility, endurance, ROM  for improvements in scapular, scapulothoracic and UE control with self care, reaching, carrying, lifting, house/yardwork, driving/computer work.    [] (67082) Provided verbal/tactile cueing for activities related to improving balance, coordination, kinesthetic sense, posture, motor skill, proprioception  to assist with  scapular, scapulothoracic and UE control with self care, reaching, carrying, lifting, house/yardwork, driving/computer work. Therapeutic Activities:    [x] (29649 or 51143) Provided verbal/tactile cueing for activities related to improving balance, coordination, kinesthetic sense, posture, motor skill, proprioception and motor activation to allow for proper function of scapular, scapulothoracic and UE control with self care, carrying, lifting, driving/computer work.      Home Exercise Program:    [x] (00818) Reviewed/Progressed HEP activities related to strengthening, flexibility, endurance, ROM of scapular, scapulothoracic and UE control with self care, reaching, carrying, lifting, house/yardwork, driving/computer work  [] (83464) Reviewed/Progressed HEP activities related to improving balance, coordination, kinesthetic sense, posture, motor skill, proprioception of scapular, scapulothoracic and UE control with self care, reaching, carrying, lifting, house/yardwork, driving/computer work      Manual Treatments:  PROM / STM / Oscillations-Mobs:  G-I, II, III, IV (PA's, Inf., Post.)  [x] (61387) Provided manual therapy to mobilize soft tissue/joints of cervical/CT, scapular GHJ and UE for the purpose of modulating pain, promoting relaxation,  increasing ROM, reducing/eliminating soft tissue swelling/inflammation/restriction, improving soft tissue extensibility and allowing for proper ROM for normal function with self care, reaching, carrying, lifting, house/yardwork, driving/computer work    Modalities:     [x] GAME READY (VASO)- for significant edema, swelling, pain control. Charges:  Timed Code Treatment Minutes: 40   Total Treatment Minutes:  40   BWC:  TE TIME:  NMR TIME:  MANUAL TIME:  UNTIMED MINUTES:  Medicare Total:   20  10  10          [] EVAL (LOW) 51460 (typically 20 minutes face-to-face)  [] EVAL (MOD) 33761 (typically 30 minutes face-to-face)  [] EVAL (HIGH) 70605 (typically 45 minutes face-to-face)  [] RE-EVAL     [x] VB(10804) x1     [] IONTO  [x] NMR (50331) x 1    [] VASO  [x] Manual (92050) x 1    [] Other:  [] TA x      [] Mech Traction (89016)  [] ES(attended) (85557)      [] ES (un) (09334):    ASSESSMENT:  See eval      GOALS:   Patient stated goal: perform ADL's pain free     Therapist goals for Patient:   Short Term Goals: To be achieved in: 2 weeks  1. Independent in HEP and progression per patient tolerance, in order to prevent re-injury. [x] Progressing: [] Met: [] Not Met: [] Adjusted     2. Patient will have a decrease in pain to facilitate improvement in movement, function, and ADLs as indicated by Functional Deficits. [x] Progressing: [] Met: [] Not Met: [] Adjusted     Long Term Goals: To be achieved in: 12 weeks  1. Disability index score of 10% or less for the DASH to assist with reaching prior level of function. [x] Progressing: [] Met: [] Not Met: [] Adjusted     2. Patient will demonstrate increased AROM to WNL to allow for proper joint functioning as indicated by patients Functional Deficits. [x] Progressing: [] Met: [] Not Met: [] Adjusted     3. Patient will demonstrate an increase in Strength to 5/5 to allow for proper functional mobility as indicated by patients Functional Deficits. [x] Progressing: [] Met: [] Not Met: [] Adjusted     4. Patient will return to all functional activities without increased symptoms or restriction. [x] Progressing: [] Met: [] Not Met: [] Adjusted     5. Pt will perform all ADL's pain-free (patient specific functional goal)    [x] Progressing: [] Met: [] Not Met: [] Adjusted        Overall Progression Towards Functional goals/ Treatment Progress Update:  [] Patient is progressing as expected towards functional goals listed. [] Progression is slowed due to complexities/Impairments listed. [] Progression has been slowed due to co-morbidities.   [x] Plan just implemented, too soon to assess goals progression <30days   [] Goals require adjustment due to lack of progress  [] Patient is not progressing as expected and requires additional follow up with physician  [] Other    Prognosis for POC: [] Good [x] Fair  [] Poor      Patient requires continued skilled intervention: [x] Yes  [] No    Treatment/Activity Tolerance:  [x] Patient able to complete treatment  [] Patient limited by fatigue  [] Patient limited by pain    [] Patient limited by other medical complications  [] Other:       PLAN: See eval  [] Continue per plan of care [] Alter current plan (see comments above)  [x] Plan of care initiated [] Hold pending MD visit [] Discharge    Electronically signed by:  ALEXI Soliman    Note: If patient does not return for scheduled/ recommended follow up visits, this note will serve as a discharge from care along with most recent update on progress.

## 2022-01-13 ENCOUNTER — HOSPITAL ENCOUNTER (OUTPATIENT)
Dept: PHYSICAL THERAPY | Age: 47
Setting detail: THERAPIES SERIES
Discharge: HOME | End: 2022-01-13
Payer: MEDICARE

## 2022-01-13 PROCEDURE — 97110 THERAPEUTIC EXERCISES: CPT

## 2022-01-13 PROCEDURE — 97140 MANUAL THERAPY 1/> REGIONS: CPT

## 2022-01-13 NOTE — PROGRESS NOTES
[address] [place] [address] and [facility], [address], [address] [place] [address] [address]  Phone: [phone]   Fax: [phone]    Date: 2022          Patient Name; :  Prabhjot Sherman; 1975   Dx: Diagnosis: M19.011 (ICD-10-CM) - Arthritis of right acromioclavicular [id number] (ICD-10-CM) - Subacromial bursitis of right shoulder joint      Physician: Referring Practitioner: Kamron Hummel        Total PT Visits: 5     Measures Previous Current   Pain (0-10)  3-5   Disability %  32%   ROM  Flexion 135  ER IR WFL             Strength  4+/5 all planes               Specific Functional Improvements & Impressions:      Plan & Recommendations:  [x] Continue rehabilitation due to objective improvement and continued functional deficits with frequency and duration:  [x] Progress toward  []GAP, []Work Conditioning, [x]Independent HEP   [] Discharge due to   [] All goals achieved, [] Maximized \"medical necessity\" [] No subjective or objective improvements      Electronically signed by:  Macarena Hoffman, PT  Therapy Plan of Care Re-Certification  This patient has been re-evaluated for physical therapy services and for therapy to continue, Medicare, Medicaid and other insurances require periodic physician review of the treatment plan. Please review the above re-evaluation and verify that you agree with plan of care as established above by signing the attached document and return it to our office or note changes to established plan below  [] Follow treatment plan as above [] Discontinue physical therapy  [] Change plan to:                                 __________________________________________________    Physician Signature:____________________________________ Date:____________  By signing above, therapists plan is approved by physician    If you have any questions or concerns, please don't hesitate to call.   Thank you for your referral.

## 2022-01-13 NOTE — FLOWSHEET NOTE
[address] and [facility]  Phone: [phone]   Fax:     [phone]      Physical Therapy Treatment Note/ Progress Report:     Date:  2022    Patient Name:  Bruce Tinajero    :  1975  MRN: [id number]  Restrictions/Precautions:    Medical/Treatment Diagnosis Information:  · Diagnosis: M19.011 (ICD-10-CM) - Arthritis of right acromioclavicular [id number] (ICD-10-CM) - Subacromial bursitis of right shoulder joint  · Treatment Diagnosis: R shoulder pain  Insurance/Certification information:  PT Insurance Information: [place]  Physician Information:  Referring Practitioner: Trent Ruano  Has the plan of care been signed (Y/N):        [x]  Yes  []  No     Date of Patient follow up with Physician: N/A    Is this a Progress Report:     []  Yes  [x]  No     If Yes:  Date Range for reporting period:  Beginnin21 ------------ Endin22    Progress report will be due (10 Rx or 30 days whichever is less):      Recertification will be due (POC Duration  / 90 days whichever is less): 3/10/22      Visit # Insurance Allowable Auth Required   In Person 5  []  Yes     []  No    Tele Health   []  Yes     []  No    Total 5       Functional Scale: DASH 34%   Date assessed:  12/10/21      Latex Allergy:  [x]NO      []YES  Preferred Language for Healthcare:   [x]English       []other:    Pain level:  /10     SUBJECTIVE:  Pt reports her shoulder feels unchanged.           OBJECTIVE:    Observation:    Test measurements:       AROM:  Flexion: 130  Abduction: 100  ER:  IR:          RESTRICTIONS/PRECAUTIONS: none    Exercises/Interventions:   Therapeutic Ex (36242) Sets/sec Reps Notes/CUES HEP   Pulleys 5'      Supine concentric circles 1 30      Supine cane flexion 2 10     SL Concentric circles 1 30 ea     TB rows 2 10 BTB    TB ext 2 10 BTB     2 10 OTB    TB IR 2 10 BTB    TB ER 2 10 GTB    2 10     10\" 5     Wall ball circles 1 15 Flex/abd, 2#    Saw ball 2 10     IR ball behind back 2 20 2#    ER ball behind head 2 10 2#    Bicep curls 3 10 3#    SA punch 2 10 3#    SL ER 3 10 1#                  Manual Intervention ([id number])       PROM into shoulder flexion, abduction, ER and IR in neutral, 45° and 90° abduction   10                                         NMR re-education (17812)   CUES NEEDED                                                                   Therapeutic Activity (97710)                                          [employer] access code: [email or web address]           Therapeutic Exercise and NMR EXR  [x] (60885) Provided verbal/tactile cueing for activities related to strengthening, flexibility, endurance, ROM  for improvements in scapular, scapulothoracic and UE control with self care, reaching, carrying, lifting, house/yardwork, driving/computer work.    [] (60267) Provided verbal/tactile cueing for activities related to improving balance, coordination, kinesthetic sense, posture, motor skill, proprioception  to assist with  scapular, scapulothoracic and UE control with self care, reaching, carrying, lifting, house/yardwork, driving/computer work. Therapeutic Activities:    [x] (71163 or 62065) Provided verbal/tactile cueing for activities related to improving balance, coordination, kinesthetic sense, posture, motor skill, proprioception and motor activation to allow for proper function of scapular, scapulothoracic and UE control with self care, carrying, lifting, driving/computer work.      Home Exercise Program:    [x] (45126) Reviewed/Progressed HEP activities related to strengthening, flexibility, endurance, ROM of scapular, scapulothoracic and UE control with self care, reaching, carrying, lifting, house/yardwork, driving/computer work  [] (80696) Reviewed/Progressed HEP activities related to improving balance, coordination, kinesthetic sense, posture, motor skill, proprioception of scapular, scapulothoracic and UE control with self care, reaching, carrying, lifting, house/yardwork, driving/computer work      Manual Treatments:  PROM / STM / Oscillations-Mobs:  G-I, II, III, IV (PA's, Inf., Post.)  [x] (54179) Provided manual therapy to mobilize soft tissue/joints of cervical/CT, scapular GHJ and UE for the purpose of modulating pain, promoting relaxation,  increasing ROM, reducing/eliminating soft tissue swelling/inflammation/restriction, improving soft tissue extensibility and allowing for proper ROM for normal function with self care, reaching, carrying, lifting, house/yardwork, driving/computer work    Modalities:     [x] GAME READY (VASO)- for significant edema, swelling, pain control. Charges:  Timed Code Treatment Minutes: 30   Total Treatment Minutes:  30   BWC:  TE TIME:  NMR TIME:  MANUAL TIME:  UNTIMED MINUTES:  Medicare Total:   20    10          [] EVAL (LOW) 00351 (typically 20 minutes face-to-face)  [] EVAL (MOD) 70182 (typically 30 minutes face-to-face)  [] EVAL (HIGH) 47346 (typically 45 minutes face-to-face)  [] RE-EVAL     [x] YX(48073) x1     [] IONTO  [] NMR (62297) x     [] VASO  [x] Manual (78464) x 1    [] Other:  [] TA x      [] Mech Traction (45357)  [] ES(attended) (17435)      [] ES (un) (91822):    ASSESSMENT:  See eval      GOALS:   Patient stated goal: perform ADL's pain free     Therapist goals for Patient:   Short Term Goals: To be achieved in: 2 weeks  1. Independent in HEP and progression per patient tolerance, in order to prevent re-injury. [x] Progressing: [] Met: [] Not Met: [] Adjusted     2. Patient will have a decrease in pain to facilitate improvement in movement, function, and ADLs as indicated by Functional Deficits. [x] Progressing: [] Met: [] Not Met: [] Adjusted     Long Term Goals: To be achieved in: 12 weeks  1. Disability index score of 10% or less for the DASH to assist with reaching prior level of function.    [x] Progressing: [] Met: [] Not Met: [] Adjusted     2. Patient will demonstrate increased AROM to WNL to allow for proper joint functioning as indicated by patients Functional Deficits. [x] Progressing: [] Met: [] Not Met: [] Adjusted     3. Patient will demonstrate an increase in Strength to 5/5 to allow for proper functional mobility as indicated by patients Functional Deficits. [x] Progressing: [] Met: [] Not Met: [] Adjusted     4. Patient will return to all functional activities without increased symptoms or restriction. [x] Progressing: [] Met: [] Not Met: [] Adjusted     5. Pt will perform all ADL's pain-free (patient specific functional goal)    [x] Progressing: [] Met: [] Not Met: [] Adjusted        Overall Progression Towards Functional goals/ Treatment Progress Update:  [] Patient is progressing as expected towards functional goals listed. [] Progression is slowed due to complexities/Impairments listed. [] Progression has been slowed due to co-morbidities. [x] Plan just implemented, too soon to assess goals progression <30days   [] Goals require adjustment due to lack of progress  [] Patient is not progressing as expected and requires additional follow up with physician  [] Other    Prognosis for POC: [] Good [x] Fair  [] Poor      Patient requires continued skilled intervention: [x] Yes  [] No    Treatment/Activity Tolerance:  [x] Patient able to complete treatment  [] Patient limited by fatigue  [] Patient limited by pain    [] Patient limited by other medical complications  [] Other:       PLAN: See eval  [] Continue per plan of care [] Alter current plan (see comments above)  [x] Plan of care initiated [] Hold pending MD visit [] Discharge    Electronically signed by:  Andreia Cabrera, PT Timmy Finley, ALEXI    Note: If patient does not return for scheduled/ recommended follow up visits, this note will serve as a discharge from care along with most recent update on progress.

## 2022-01-17 ENCOUNTER — APPOINTMENT (OUTPATIENT)
Dept: PHYSICAL THERAPY | Age: 47
End: 2022-01-17
Payer: MEDICARE

## 2022-01-24 ENCOUNTER — HOSPITAL ENCOUNTER (OUTPATIENT)
Dept: PHYSICAL THERAPY | Age: 47
Setting detail: THERAPIES SERIES
Discharge: HOME | End: 2022-01-24
Payer: MEDICARE

## 2022-01-24 PROCEDURE — 97140 MANUAL THERAPY 1/> REGIONS: CPT

## 2022-01-24 PROCEDURE — 97112 NEUROMUSCULAR REEDUCATION: CPT

## 2022-01-24 PROCEDURE — 97110 THERAPEUTIC EXERCISES: CPT

## 2022-01-24 NOTE — FLOWSHEET NOTE
[address] [place] [address] and [facility], [address] [place] [address] [address]  Phone: [phone]   Fax:     [phone]      Physical Therapy Treatment Note/ Progress Report:     Date:  2022    Patient Name:  Tangela Sterling    :  1975  MRN: [id number]  Restrictions/Precautions:    Medical/Treatment Diagnosis Information:  · Diagnosis: M19.011 (ICD-10-CM) - Arthritis of right acromioclavicular [id number] (ICD-10-CM) - Subacromial bursitis of right shoulder joint  · Treatment Diagnosis: R shoulder pain  Insurance/Certification information:  PT Insurance Information: [place]  Physician Information:  Referring Practitioner: Roger Perez  Has the plan of care been signed (Y/N):        [x]  Yes  []  No     Date of Patient follow up with Physician: N/A    Is this a Progress Report:     []  Yes  [x]  No     If Yes:  Date Range for reporting period:  Beginnin21 ------------ Endin22    Progress report will be due (10 Rx or 30 days whichever is less):      Recertification will be due (POC Duration  / 90 days whichever is less): 3/10/22      Visit # Insurance Allowable Auth Required   In Person 6  []  Yes     []  No    Tele Health   []  Yes     []  No    Total 6       Functional Scale: DASH 34%   Date assessed:  12/10/21      Latex Allergy:  [x]NO      []YES  Preferred Language for Healthcare:   [x]English       []other:    Pain level:  10     SUBJECTIVE:  Pt reports her shoulder remains unchanged but has not really done her HEP.          OBJECTIVE:    Observation:    Test measurements:       AROM:  Flexion: 130  Abduction: 100  ER:   IR:          RESTRICTIONS/PRECAUTIONS: none    Exercises/Interventions:   Therapeutic Ex (39895) Sets/sec Reps Notes/CUES HEP   Pulleys 5'      Supine concentric circles 1 30      Supine cane flexion 2 10     SL Concentric circles 1 30 ea     TB rows 2 10 BTB    TB ext 2 10 BTB     2 10 OTB    TB IR 2 10 BTB    TB ER 2 10 GTB    2 10     10\" 5     Wall ball circles 1 15 Flex/abd, 2#    Saw ball 2 10     IR ball behind back 2 20 2#    ER ball behind head 2 10 2#    Bicep curls 3 10 3#    SA punch 2 10 3#    SL ER 3 10 1#    Wall slide 2 10            Manual Intervention ([id number])       PROM into shoulder flexion, abduction, ER and IR in neutral, 45° and 90° abduction   8                                         NMR re-education (10615)   CUES NEEDED                                                                   Therapeutic Activity (29467)                                          Flipboard access code: [email or web address]           Therapeutic Exercise and NMR EXR  [x] (66161) Provided verbal/tactile cueing for activities related to strengthening, flexibility, endurance, ROM  for improvements in scapular, scapulothoracic and UE control with self care, reaching, carrying, lifting, house/yardwork, driving/computer work.    [] (85145) Provided verbal/tactile cueing for activities related to improving balance, coordination, kinesthetic sense, posture, motor skill, proprioception  to assist with  scapular, scapulothoracic and UE control with self care, reaching, carrying, lifting, house/yardwork, driving/computer work. Therapeutic Activities:    [x] (33422 or 27478) Provided verbal/tactile cueing for activities related to improving balance, coordination, kinesthetic sense, posture, motor skill, proprioception and motor activation to allow for proper function of scapular, scapulothoracic and UE control with self care, carrying, lifting, driving/computer work.      Home Exercise Program:    [x] (19703) Reviewed/Progressed HEP activities related to strengthening, flexibility, endurance, ROM of scapular, scapulothoracic and UE control with self care, reaching, carrying, lifting, house/yardwork, driving/computer work  [] (60650) Reviewed/Progressed HEP activities related to improving balance, coordination, kinesthetic sense, posture, motor skill, proprioception of scapular, scapulothoracic and UE control with self care, reaching, carrying, lifting, house/yardwork, driving/computer work      Manual Treatments:  PROM / STM / Oscillations-Mobs:  G-I, II, III, IV (PA's, Inf., Post.)  [x] (71820) Provided manual therapy to mobilize soft tissue/joints of cervical/CT, scapular GHJ and UE for the purpose of modulating pain, promoting relaxation,  increasing ROM, reducing/eliminating soft tissue swelling/inflammation/restriction, improving soft tissue extensibility and allowing for proper ROM for normal function with self care, reaching, carrying, lifting, house/yardwork, driving/computer work    Modalities:     [x] GAME READY (VASO)- for significant edema, swelling, pain control. Charges:  Timed Code Treatment Minutes: 38   Total Treatment Minutes:  38   BWC:  TE TIME:  NMR TIME:  MANUAL TIME:  UNTIMED MINUTES:  Medicare Total:   20  10  8          [] EVAL (LOW) 53493 (typically 20 minutes face-to-face)  [] EVAL (MOD) 44922 (typically 30 minutes face-to-face)  [] EVAL (HIGH) 09521 (typically 45 minutes face-to-face)  [] RE-EVAL     [x] IB(83205) x 1     [] IONTO  [x] NMR (52789) x 1     [] VASO  [x] Manual (78437) x 1    [] Other:  [] TA x      [] Mech Traction (51965)  [] ES(attended) (11360)      [] ES (un) (85234):    ASSESSMENT:  See eval      GOALS:   Patient stated goal: perform ADL's pain free     Therapist goals for Patient:   Short Term Goals: To be achieved in: 2 weeks  1. Independent in HEP and progression per patient tolerance, in order to prevent re-injury. [x] Progressing: [] Met: [] Not Met: [] Adjusted     2. Patient will have a decrease in pain to facilitate improvement in movement, function, and ADLs as indicated by Functional Deficits. [x] Progressing: [] Met: [] Not Met: [] Adjusted     Long Term Goals: To be achieved in: 12 weeks  1.  Disability index score of 10% or less for the DASH to assist with reaching prior level of function. [x] Progressing: [] Met: [] Not Met: [] Adjusted     2. Patient will demonstrate increased AROM to WNL to allow for proper joint functioning as indicated by patients Functional Deficits. [x] Progressing: [] Met: [] Not Met: [] Adjusted     3. Patient will demonstrate an increase in Strength to 5/5 to allow for proper functional mobility as indicated by patients Functional Deficits. [x] Progressing: [] Met: [] Not Met: [] Adjusted     4. Patient will return to all functional activities without increased symptoms or restriction. [x] Progressing: [] Met: [] Not Met: [] Adjusted     5. Pt will perform all ADL's pain-free (patient specific functional goal)    [x] Progressing: [] Met: [] Not Met: [] Adjusted        Overall Progression Towards Functional goals/ Treatment Progress Update:  [] Patient is progressing as expected towards functional goals listed. [] Progression is slowed due to complexities/Impairments listed. [] Progression has been slowed due to co-morbidities.   [x] Plan just implemented, too soon to assess goals progression <30days   [] Goals require adjustment due to lack of progress  [] Patient is not progressing as expected and requires additional follow up with physician  [] Other    Prognosis for POC: [] Good [x] Fair  [] Poor      Patient requires continued skilled intervention: [x] Yes  [] No    Treatment/Activity Tolerance:  [x] Patient able to complete treatment  [] Patient limited by fatigue  [] Patient limited by pain    [] Patient limited by other medical complications  [] Other:       PLAN: See eval  [] Continue per plan of care [] Alter current plan (see comments above)  [x] Plan of care initiated [] Hold pending MD visit [] Discharge    Electronically signed by:  DANIELLE Woods, SPT    Note: If patient does not return for scheduled/ recommended follow up visits, this note will serve as a discharge from care along with most recent update on progress.

## 2022-03-01 ENCOUNTER — OFFICE VISIT (OUTPATIENT)
Dept: FAMILY MEDICINE CLINIC | Age: 47
End: 2022-03-01
Payer: MEDICARE

## 2022-03-01 VITALS
OXYGEN SATURATION: 100 % | WEIGHT: 170 LBS | SYSTOLIC BLOOD PRESSURE: 118 MMHG | DIASTOLIC BLOOD PRESSURE: 70 MMHG | HEART RATE: 78 BPM | BODY MASS INDEX: 27.44 KG/M2

## 2022-03-01 DIAGNOSIS — Z13.220 SCREENING CHOLESTEROL LEVEL: ICD-10-CM

## 2022-03-01 DIAGNOSIS — E55.9 VITAMIN D DEFICIENCY: ICD-10-CM

## 2022-03-01 DIAGNOSIS — R30.0 DYSURIA: Primary | ICD-10-CM

## 2022-03-01 DIAGNOSIS — Z98.84 HISTORY OF BARIATRIC SURGERY: ICD-10-CM

## 2022-03-01 DIAGNOSIS — E53.8 VITAMIN B12 DEFICIENCY: ICD-10-CM

## 2022-03-01 LAB
A/G RATIO: 2.1 (ref 1.1–2.2)
ALBUMIN SERPL-MCNC: 4.7 G/DL (ref 3.4–5)
ALP BLD-CCNC: 77 U/L (ref 40–129)
ALT SERPL-CCNC: 21 U/L (ref 10–40)
ANION GAP SERPL CALCULATED.3IONS-SCNC: 15 MMOL/L (ref 3–16)
AST SERPL-CCNC: 18 U/L (ref 15–37)
BILIRUB SERPL-MCNC: 0.5 MG/DL (ref 0–1)
BILIRUBIN, POC: NORMAL
BLOOD URINE, POC: NORMAL
BUN BLDV-MCNC: 19 MG/DL (ref 7–20)
CALCIUM SERPL-MCNC: 9.9 MG/DL (ref 8.3–10.6)
CHLORIDE BLD-SCNC: 98 MMOL/L (ref 99–110)
CHOLESTEROL, TOTAL: 209 MG/DL (ref 0–199)
CLARITY, POC: CLEAR
CO2: 28 MMOL/L (ref 21–32)
COLOR, POC: NORMAL
CREAT SERPL-MCNC: 0.7 MG/DL (ref 0.6–1.1)
GFR AFRICAN AMERICAN: >60
GFR NON-AFRICAN AMERICAN: >60
GLUCOSE BLD-MCNC: 86 MG/DL (ref 70–99)
GLUCOSE URINE, POC: NORMAL
HCT VFR BLD CALC: 44.3 % (ref 36–48)
HDLC SERPL-MCNC: 66 MG/DL (ref 40–60)
HEMOGLOBIN: 14.6 G/DL (ref 12–16)
IRON SATURATION: 48 % (ref 15–50)
IRON: 144 UG/DL (ref 37–145)
KETONES, POC: NORMAL
LDL CHOLESTEROL CALCULATED: 115 MG/DL
LEUKOCYTE EST, POC: NORMAL
MCH RBC QN AUTO: 31.2 PG (ref 26–34)
MCHC RBC AUTO-ENTMCNC: 33 G/DL (ref 31–36)
MCV RBC AUTO: 94.5 FL (ref 80–100)
NITRITE, POC: POSITIVE
PDW BLD-RTO: 13.6 % (ref 12.4–15.4)
PH, POC: 5.5
PLATELET # BLD: 315 K/UL (ref 135–450)
PMV BLD AUTO: 7.9 FL (ref 5–10.5)
POTASSIUM SERPL-SCNC: 4.7 MMOL/L (ref 3.5–5.1)
PROTEIN, POC: 30
RBC # BLD: 4.69 M/UL (ref 4–5.2)
SODIUM BLD-SCNC: 141 MMOL/L (ref 136–145)
SPECIFIC GRAVITY, POC: >=1.03
TOTAL IRON BINDING CAPACITY: 301 UG/DL (ref 260–445)
TOTAL PROTEIN: 6.9 G/DL (ref 6.4–8.2)
TRIGL SERPL-MCNC: 140 MG/DL (ref 0–150)
UROBILINOGEN, POC: 1
VITAMIN B-12: 1853 PG/ML (ref 211–911)
VITAMIN D 25-HYDROXY: 39.4 NG/ML
VLDLC SERPL CALC-MCNC: 28 MG/DL
WBC # BLD: 8.2 K/UL (ref 4–11)

## 2022-03-01 PROCEDURE — 1036F TOBACCO NON-USER: CPT | Performed by: FAMILY MEDICINE

## 2022-03-01 PROCEDURE — G8428 CUR MEDS NOT DOCUMENT: HCPCS | Performed by: FAMILY MEDICINE

## 2022-03-01 PROCEDURE — G8482 FLU IMMUNIZE ORDER/ADMIN: HCPCS | Performed by: FAMILY MEDICINE

## 2022-03-01 PROCEDURE — G8419 CALC BMI OUT NRM PARAM NOF/U: HCPCS | Performed by: FAMILY MEDICINE

## 2022-03-01 PROCEDURE — 99213 OFFICE O/P EST LOW 20 MIN: CPT | Performed by: FAMILY MEDICINE

## 2022-03-01 RX ORDER — FLUCONAZOLE 150 MG/1
150 TABLET ORAL ONCE
Qty: 1 TABLET | Refills: 0 | Status: SHIPPED | OUTPATIENT
Start: 2022-03-01 | End: 2022-03-17 | Stop reason: SDUPTHER

## 2022-03-01 RX ORDER — CEPHALEXIN 500 MG/1
500 CAPSULE ORAL 3 TIMES DAILY
Qty: 30 CAPSULE | Refills: 0 | Status: SHIPPED | OUTPATIENT
Start: 2022-03-01 | End: 2022-03-17 | Stop reason: SDUPTHER

## 2022-03-01 NOTE — PROGRESS NOTES
Vielka Mei is a 51 y.o. female    Chief Complaint   Patient presents with   Pierre Peter Frequency at Night       HPI:    Urinary Tract Infection   This is a new problem. The current episode started 1 to 4 weeks ago. The problem has been gradually worsening. The quality of the pain is described as burning. There has been no fever. Associated symptoms include frequency. Pertinent negatives include no hematuria or possible pregnancy. She has tried increased fluids for the symptoms. The treatment provided mild relief. There is no history of recurrent UTIs. ROS:    Review of Systems   Genitourinary: Positive for frequency. Negative for hematuria. /70   Pulse 78   Wt 170 lb (77.1 kg)   SpO2 100%   BMI 27.44 kg/m²     Physical Exam:    Physical Exam  Constitutional:       General: She is not in acute distress. Appearance: Normal appearance. She is not ill-appearing or toxic-appearing. HENT:      Head: Normocephalic. Neurological:      Mental Status: She is alert. Psychiatric:         Mood and Affect: Mood normal.         Behavior: Behavior normal.         Thought Content:  Thought content normal.         Current Outpatient Medications   Medication Sig Dispense Refill    cephALEXin (KEFLEX) 500 MG capsule Take 1 capsule by mouth 3 times daily for 10 days 30 capsule 0    fluconazole (DIFLUCAN) 150 MG tablet Take 1 tablet by mouth once for 1 dose 1 tablet 0    cariprazine hcl (VRAYLAR) 1.5 MG capsule Take 1.5 mg by mouth daily      divalproex (DEPAKOTE ER) 500 MG extended release tablet Take 2,000 mg by mouth nightly      busPIRone (BUSPAR) 10 MG tablet Take 10 mg by mouth 2 times daily      FLUoxetine (PROZAC) 40 MG capsule Take 40 mg by mouth daily      vitamin D (ERGOCALCIFEROL) 1.25 MG (15915 UT) CAPS capsule Take 1 capsule by mouth once a week 4 capsule 0    vitamin B-12 (CYANOCOBALAMIN) 100 MCG tablet Take 1 tablet by mouth once a week 5 tablet 0    Ascorbic Acid (VITAMIN C) 250 MG tablet Take 500 mg by mouth daily      lactase (LACTAID ULTRA) 9000 units TABS Take 9,000 Units by mouth once      calcium carbonate (TUMS) 500 MG chewable tablet Take 1 tablet by mouth daily      ferrous sulfate 325 (65 FE) MG tablet Take 325 mg by mouth daily (with breakfast) Every other day      Multiple Vitamins-Minerals (THERAPEUTIC MULTIVITAMIN-MINERALS) tablet Take 1 tablet by mouth daily.  ibuprofen (IBU) 600 MG tablet Take 1 tablet by mouth every 6 hours as needed for Pain for 20 doses. 20 tablet 0    vitamin B-12 (CYANOCOBALAMIN) 100 MCG tablet Take 50 mcg by mouth daily 5 days a week      Loratadine (CLARITIN) 10 MG CAPS Take  by mouth. No current facility-administered medications for this visit. Assessment:    1. Dysuria        Plan:    1. Dysuria  Increase water intake. - POCT Urinalysis no Micro  - Culture, Urine  - cephALEXin (KEFLEX) 500 MG capsule; Take 1 capsule by mouth 3 times daily for 10 days  Dispense: 30 capsule; Refill: 0      Patient to return to clinic if symptoms worsen or fail to improve.

## 2022-03-03 LAB
ORGANISM: ABNORMAL
URINE CULTURE, ROUTINE: ABNORMAL

## 2022-03-17 ENCOUNTER — TELEPHONE (OUTPATIENT)
Dept: FAMILY MEDICINE CLINIC | Age: 47
End: 2022-03-17

## 2022-03-17 DIAGNOSIS — R30.0 DYSURIA: ICD-10-CM

## 2022-03-17 RX ORDER — FLUCONAZOLE 150 MG/1
150 TABLET ORAL ONCE
Qty: 1 TABLET | Refills: 0 | Status: SHIPPED | OUTPATIENT
Start: 2022-03-17 | End: 2022-03-17

## 2022-03-17 RX ORDER — CEPHALEXIN 500 MG/1
500 CAPSULE ORAL 3 TIMES DAILY
Qty: 30 CAPSULE | Refills: 0 | Status: SHIPPED | OUTPATIENT
Start: 2022-03-17 | End: 2022-03-27

## 2022-03-17 NOTE — TELEPHONE ENCOUNTER
Her urine culture grew out E. coli which is the most common cause of UTIs in adult women. It was sensitive to all the antibiotics including Keflex which she was given. We could do another round of the Keflex. I will send this in with a prescription for Diflucan in case she gets a yeast infection. Drink lots of water.     Thank you

## 2022-03-17 NOTE — TELEPHONE ENCOUNTER
Pt calling in stated she saw dr. Ilsa Freitas om 3/1 for a UTI, stated she finished her ABX and feels she is still having sx.  Asking for another round of ABX

## 2022-06-28 ENCOUNTER — NURSE ONLY (OUTPATIENT)
Dept: FAMILY MEDICINE CLINIC | Age: 47
End: 2022-06-28

## 2022-06-28 ENCOUNTER — TELEPHONE (OUTPATIENT)
Dept: FAMILY MEDICINE CLINIC | Age: 47
End: 2022-06-28

## 2022-06-28 ENCOUNTER — TELEMEDICINE (OUTPATIENT)
Dept: PRIMARY CARE CLINIC | Age: 47
End: 2022-06-28
Payer: MEDICARE

## 2022-06-28 DIAGNOSIS — R39.89 SUSPECTED UTI: ICD-10-CM

## 2022-06-28 DIAGNOSIS — R30.0 DYSURIA: Primary | ICD-10-CM

## 2022-06-28 DIAGNOSIS — N30.01 ACUTE CYSTITIS WITH HEMATURIA: Primary | ICD-10-CM

## 2022-06-28 LAB
BILIRUBIN, POC: NEGATIVE
BLOOD URINE, POC: ABNORMAL
CLARITY, POC: ABNORMAL
COLOR, POC: ABNORMAL
GLUCOSE URINE, POC: NEGATIVE
KETONES, POC: ABNORMAL
LEUKOCYTE EST, POC: ABNORMAL
NITRITE, POC: ABNORMAL
PH, POC: 5.5
PROTEIN, POC: 100
SPECIFIC GRAVITY, POC: >=1.03
UROBILINOGEN, POC: 0.2

## 2022-06-28 PROCEDURE — G8427 DOCREV CUR MEDS BY ELIG CLIN: HCPCS | Performed by: NURSE PRACTITIONER

## 2022-06-28 PROCEDURE — 99213 OFFICE O/P EST LOW 20 MIN: CPT | Performed by: NURSE PRACTITIONER

## 2022-06-28 RX ORDER — NITROFURANTOIN 25; 75 MG/1; MG/1
100 CAPSULE ORAL 2 TIMES DAILY
Qty: 20 CAPSULE | Refills: 0 | Status: SHIPPED | OUTPATIENT
Start: 2022-06-28 | End: 2022-07-08

## 2022-06-28 RX ORDER — PHENAZOPYRIDINE HYDROCHLORIDE 200 MG/1
200 TABLET, FILM COATED ORAL 3 TIMES DAILY PRN
Qty: 9 TABLET | Refills: 0 | Status: SHIPPED | OUTPATIENT
Start: 2022-06-28 | End: 2022-07-01

## 2022-06-28 ASSESSMENT — ENCOUNTER SYMPTOMS
ABDOMINAL PAIN: 1
RESPIRATORY NEGATIVE: 1

## 2022-06-28 NOTE — TELEPHONE ENCOUNTER
Message/Telephone call regarding - New or worsening symptoms      Symptoms reported - Neph/Urology - frequency, foul smelling urine , incontinence and urgency  Pain Scale - N/A     When did symptoms start - several days ago  Onset - with a sudden onset    Aggravating factors - urinating   Relieving actions and treatment(s) attempted - None    Last Appointment at [facility] - 3/1/2022  Next Appointment - [email or web address]      Is there any other information to share with PCP -  no    REFRESH after scheduling appointment. No future appointments.

## 2022-06-28 NOTE — PROGRESS NOTES
Patient is scheduled with Darcey Simmonds, APRN-CNP for a virtual visit today at 5 pm for UTI symptoms. Patient came into the office and gave a urine sample. I order a POCT Urinalysis and culture with Louise's  permission.    Please see results

## 2022-06-28 NOTE — PROGRESS NOTES
2022    TELEHEALTH EVALUATION -- Audio/Visual (During [facility] public health emergency)    Chief Complaint   Patient presents with    Dysuria    Urinary Frequency    Other     Foul odor to urine    Urinary Tract Infection         HPI:    Jayla Almodovar (:  1975) has requested an audio/video evaluation for the following concern(s):    Past several day has noticed urine frequency, foul smelling urine , incontinence and urgency. Thinks UTI. Some back discomfort. Suprapubic pain. Denies fever/chills. Review of Systems   Constitutional: Negative for chills, fatigue and fever. Respiratory: Negative. Cardiovascular: Negative. Gastrointestinal: Positive for abdominal pain (suprapubic tenderness). Genitourinary: Positive for dysuria, flank pain, frequency and urgency. Negative for hematuria. Skin: Negative. Psychiatric/Behavioral: Negative. Prior to Visit Medications    Medication Sig Taking?  Authorizing Provider   nitrofurantoin, macrocrystal-monohydrate, (MACROBID) 100 MG capsule Take 1 capsule by mouth 2 times daily for 10 days Yes PRATEEK Gutiérrez CNP   phenazopyridine (PYRIDIUM) 200 MG tablet Take 1 tablet by mouth 3 times daily as needed for Pain Yes PRATEEK Gutiérrez CNP   cariprazine hcl (VRAYLAR) 1.5 MG capsule Take 1.5 mg by mouth daily  Historical Provider, MD   divalproex (DEPAKOTE ER) 500 MG extended release tablet Take 2,000 mg by mouth nightly  Historical Provider, MD   busPIRone (BUSPAR) 10 MG tablet Take 10 mg by mouth 2 times daily  Historical Provider, MD   FLUoxetine (PROZAC) 40 MG capsule Take 40 mg by mouth daily  Historical Provider, MD   vitamin D (ERGOCALCIFEROL) 1.25 MG (02220 UT) CAPS capsule Take 1 capsule by mouth once a week  Jaswant Redding MD   vitamin B-12 (CYANOCOBALAMIN) 100 MCG tablet Take 1 tablet by mouth once a week  Jaswant Redding MD   Ascorbic Acid (VITAMIN C) 250 MG tablet Take 500 mg by mouth daily  Historical Provider, MD   lactase (LACTAID ULTRA) 9000 units TABS Take 9,000 Units by mouth once  Historical Provider, MD   calcium carbonate (TUMS) 500 MG chewable tablet Take 1 tablet by mouth daily  Historical Provider, MD   ferrous sulfate 325 (65 FE) MG tablet Take 325 mg by mouth daily (with breakfast) Every other day  Historical Provider, MD   Multiple Vitamins-Minerals (THERAPEUTIC MULTIVITAMIN-MINERALS) tablet Take 1 tablet by mouth daily. Historical Provider, MD   ibuprofen (IBU) 600 MG tablet Take 1 tablet by mouth every 6 hours as needed for Pain for 20 doses. Reggie Walls MD   vitamin B-12 (CYANOCOBALAMIN) 100 MCG tablet Take 50 mcg by mouth daily 5 days a week  Historical Provider, MD   Loratadine (CLARITIN) 10 MG CAPS Take  by mouth.     Historical Provider, MD       Social History     Tobacco Use    Smoking status: Former Smoker     Packs/day: 1.00     Years: 10.00     Pack years: 10.00     Types: Cigarettes     Quit date: 5/23/2012     Years since quitting: 10.1    Smokeless tobacco: Never Used    Tobacco comment: uses nicotine gum   Vaping Use    Vaping Use: Former   Substance Use Topics    Alcohol use: No     Alcohol/week: 1.7 standard drinks     Types: 2 Standard drinks or equivalent per week     Comment: quit 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    Drug use: Yes     Frequency: 7.0 times per week     Types: Marijuana (Weed)     Comment: takes 4 capsules full of liquid MJ        Allergies   Allergen Reactions    Lactose Nausea Only    Sulfa Antibiotics     Toviaz [Fesoterodine Fumarate Er]     Tape [Adhesive Tape] Rash     And bandaids - can wear for short time   ,   Past Medical History:   Diagnosis Date    Anxiety     Depression     Influenza B 03/30/2017    Interstitial cystitis     Irregular uterine bleeding     PCOS (polycystic ovarian syndrome) 10/05/2020    Personality disorder ([facility] [address]     PTSD (post-traumatic stress disorder)     Skull fracture ([facility] [address]     as infant    Sleep apnea    , Past Surgical History:   Procedure Laterality Date    CHOLECYSTECTOMY      DILATION AND CURETTAGE OF UTERUS      ECTOPIC PREGNANCY SURGERY      x 2    ENDOMETRIAL ABLATION      FOOT TENOLYSIS Right 10/05/2020    GASTRIC BYPASS  [id number]    X 2    HYSTEROSCOPY  9/3/15    D&C, Thermal Ablation    WISDOM TOOTH EXTRACTION      WRIST FRACTURE SURGERY Right 10/22/2014    with [facility]   ,   Social History     Tobacco Use    Smoking status: Former Smoker     Packs/day: 1.00     Years: 10.00     Pack years: 10.00     Types: Cigarettes     Quit date: 5/23/2012     Years since quitting: 10.1    Smokeless tobacco: Never Used    Tobacco comment: uses nicotine gum   Vaping Use    Vaping Use: Former   Substance Use Topics    Alcohol use: No     Alcohol/week: 1.7 standard drinks     Types: 2 Standard drinks or equivalent per week     Comment: quit 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    Drug use: Yes     Frequency: 7.0 times per week     Types: Marijuana (Weed)     Comment: takes 4 capsules full of liquid MJ   ,   Family History   Problem Relation Age of Onset    Arthritis Mother     Depression Mother     High Blood Pressure Father     Depression Father     Depression Sister     Alcohol Abuse Maternal Grandfather     Alcohol Abuse Paternal Grandfather    ,   Immunization History   Administered Date(s) Administered    COVID-19, PFIZER PURPLE top, DILUTE for use, (age 15 y+), 30mcg/0.3mL 04/07/2021, 04/28/2021    Influenza Virus Vaccine 10/25/2020    Influenza, MDCK Quadv, IM, PF (Flucelvax 2 yrs and older) 10/25/2020, 11/15/2021    Influenza, Quadv, IM, PF (6 mo and older Fluzone, Flulaval, Fluarix, and 3 yrs and older Afluria) 11/21/2017, 11/06/2018, 10/04/2019       PHYSICAL EXAMINATION:  [ INSTRUCTIONS:  \"[x]\" Indicates a positive item  \"[]\" Indicates a negative item  -- DELETE ALL ITEMS NOT EXAMINED]  Vital Signs: (As obtained by patient/caregiver or practitioner observation)    Blood pressure-  Heart rate- Respiratory rate-    Temperature-  Pulse oximetry-     Constitutional: [x] Appears well-developed and well-nourished [x] No apparent distress      [] Abnormal-   Mental status  [x] Alert and awake  [x] Oriented to person/place/time [x]Able to follow commands      Eyes:  EOM    [x]  Normal  [] Abnormal-  Sclera  [x]  Normal  [] Abnormal -         Discharge [x]  None visible  [] Abnormal -    HENT:   [x] Normocephalic, atraumatic. [] Abnormal   [] Mouth/Throat: Mucous membranes are moist.     External Ears [x] Normal  [] Abnormal-     Neck: [x] No visualized mass     Pulmonary/Chest: [x] Respiratory effort normal.  [x] No visualized signs of difficulty breathing or respiratory distress        [] Abnormal-      Musculoskeletal:   [] Normal gait with no signs of ataxia         [x] Normal range of motion of neck        [] Abnormal-       Neurological:        [x] No Facial Asymmetry (Cranial nerve 7 motor function) (limited exam to video visit)          [x] No gaze palsy        [] Abnormal-         Skin:        [x] No significant exanthematous lesions or discoloration noted on facial skin         [] Abnormal-            Psychiatric:       [x] Normal Affect [] No Hallucinations        [] Abnormal-     Other pertinent observable physical exam findings-     ASSESSMENT/PLAN:  1. Acute cystitis with hematuria  If no improvement/or worsening of condition, notify office for further follow up. Drink plenty of fluids. (Limit caffeine, spicy foods, and alcohol). Take medication as prescribed. You make drink cranberry juice. Take probiotics as directed. May take Tylenol for fever. Make sure to wipe from front to back. Practice good hand hygiene. Empty bladder as soon as you have the urge to do so. - nitrofurantoin, macrocrystal-monohydrate, (MACROBID) 100 MG capsule; Take 1 capsule by mouth 2 times daily for 10 days  Dispense: 20 capsule; Refill: 0  - phenazopyridine (PYRIDIUM) 200 MG tablet;  Take 1 tablet by mouth 3

## 2022-06-28 NOTE — PATIENT INSTRUCTIONS
If no improvement/or worsening of condition, notify office for further follow up. Drink plenty of fluids. (Limit caffeine, spicy foods, and alcohol). Take medication as prescribed. You make drink cranberry juice. Take probiotics as directed. May take Tylenol for fever. Make sure to wipe from front to back. Practice good hand hygiene. Empty bladder as soon as you have the urge to do so.

## 2022-06-30 LAB
ORGANISM: ABNORMAL
URINE CULTURE, ROUTINE: ABNORMAL

## 2022-07-13 ENCOUNTER — TELEPHONE (OUTPATIENT)
Dept: FAMILY MEDICINE CLINIC | Age: 47
End: 2022-07-13

## 2022-07-13 ENCOUNTER — E-VISIT (OUTPATIENT)
Dept: PRIMARY CARE CLINIC | Age: 47
End: 2022-07-13
Payer: MEDICARE

## 2022-07-13 DIAGNOSIS — B37.9 YEAST INFECTION: Primary | ICD-10-CM

## 2022-07-13 PROCEDURE — 99421 OL DIG E/M SVC 5-10 MIN: CPT | Performed by: NURSE PRACTITIONER

## 2022-07-13 RX ORDER — FLUCONAZOLE 150 MG/1
150 TABLET ORAL ONCE
Qty: 2 TABLET | Refills: 0 | Status: SHIPPED | OUTPATIENT
Start: 2022-07-13 | End: 2022-07-13

## 2022-07-13 NOTE — TELEPHONE ENCOUNTER
Please have her do an evisit and send to me or she can schedule an in office visit if she would prefer

## 2022-07-13 NOTE — TELEPHONE ENCOUNTER
Patient called had a visit 2 weeks ago finished ATB for UTI still having symptoms and have not improved. Feels like she's having a yeast infection coming on now Patient can be reached at [phone].  Please advise

## 2022-07-14 NOTE — PROGRESS NOTES
Please call and schedule patient with PCP to be seen in the 2 days for recurring UTI.  Thanks, Adán Gutierrez

## 2022-07-18 ENCOUNTER — OFFICE VISIT (OUTPATIENT)
Dept: FAMILY MEDICINE CLINIC | Age: 47
End: 2022-07-18
Payer: MEDICARE

## 2022-07-18 VITALS
OXYGEN SATURATION: 96 % | WEIGHT: 199.2 LBS | HEART RATE: 86 BPM | DIASTOLIC BLOOD PRESSURE: 72 MMHG | SYSTOLIC BLOOD PRESSURE: 102 MMHG | BODY MASS INDEX: 32.02 KG/M2 | HEIGHT: 66 IN

## 2022-07-18 DIAGNOSIS — Z12.11 COLON CANCER SCREENING: ICD-10-CM

## 2022-07-18 DIAGNOSIS — N32.81 OAB (OVERACTIVE BLADDER): ICD-10-CM

## 2022-07-18 DIAGNOSIS — R30.0 BURNING WITH URINATION: ICD-10-CM

## 2022-07-18 DIAGNOSIS — L02.91 ABSCESS: ICD-10-CM

## 2022-07-18 DIAGNOSIS — R30.9 PAINFUL URINATION: Primary | ICD-10-CM

## 2022-07-18 LAB
BILIRUBIN, POC: NORMAL
BLOOD URINE, POC: NORMAL
CLARITY, POC: NORMAL
COLOR, POC: NORMAL
GLUCOSE URINE, POC: NORMAL
KETONES, POC: NORMAL
LEUKOCYTE EST, POC: NORMAL
NITRITE, POC: NORMAL
PH, POC: 5.5
PROTEIN, POC: NORMAL
SPECIFIC GRAVITY, POC: >=1.03
UROBILINOGEN, POC: 1

## 2022-07-18 PROCEDURE — 1036F TOBACCO NON-USER: CPT | Performed by: PHYSICIAN ASSISTANT

## 2022-07-18 PROCEDURE — G8427 DOCREV CUR MEDS BY ELIG CLIN: HCPCS | Performed by: PHYSICIAN ASSISTANT

## 2022-07-18 PROCEDURE — 99214 OFFICE O/P EST MOD 30 MIN: CPT | Performed by: PHYSICIAN ASSISTANT

## 2022-07-18 PROCEDURE — G8419 CALC BMI OUT NRM PARAM NOF/U: HCPCS | Performed by: PHYSICIAN ASSISTANT

## 2022-07-18 RX ORDER — MUPIROCIN CALCIUM 20 MG/G
CREAM TOPICAL
Qty: 15 G | Refills: 0 | Status: SHIPPED | OUTPATIENT
Start: 2022-07-18 | End: 2022-08-17

## 2022-07-18 RX ORDER — OXYBUTYNIN CHLORIDE 5 MG/1
5 TABLET ORAL 2 TIMES DAILY
Qty: 90 TABLET | Refills: 3 | Status: SHIPPED | OUTPATIENT
Start: 2022-07-18

## 2022-07-18 ASSESSMENT — PATIENT HEALTH QUESTIONNAIRE - PHQ9
7. TROUBLE CONCENTRATING ON THINGS, SUCH AS READING THE NEWSPAPER OR WATCHING TELEVISION: 1
2. FEELING DOWN, DEPRESSED OR HOPELESS: 2
9. THOUGHTS THAT YOU WOULD BE BETTER OFF DEAD, OR OF HURTING YOURSELF: 0
10. IF YOU CHECKED OFF ANY PROBLEMS, HOW DIFFICULT HAVE THESE PROBLEMS MADE IT FOR YOU TO DO YOUR WORK, TAKE CARE OF THINGS AT HOME, OR GET ALONG WITH OTHER PEOPLE: 1
SUM OF ALL RESPONSES TO PHQ QUESTIONS 1-9: 10
SUM OF ALL RESPONSES TO PHQ QUESTIONS 1-9: 10
SUM OF ALL RESPONSES TO PHQ9 QUESTIONS 1 & 2: 4
5. POOR APPETITE OR OVEREATING: 2
4. FEELING TIRED OR HAVING LITTLE ENERGY: 1
3. TROUBLE FALLING OR STAYING ASLEEP: 0
8. MOVING OR SPEAKING SO SLOWLY THAT OTHER PEOPLE COULD HAVE NOTICED. OR THE OPPOSITE, BEING SO FIGETY OR RESTLESS THAT YOU HAVE BEEN MOVING AROUND A LOT MORE THAN USUAL: 1
1. LITTLE INTEREST OR PLEASURE IN DOING THINGS: 2
SUM OF ALL RESPONSES TO PHQ QUESTIONS 1-9: 10
6. FEELING BAD ABOUT YOURSELF - OR THAT YOU ARE A FAILURE OR HAVE LET YOURSELF OR YOUR FAMILY DOWN: 1
SUM OF ALL RESPONSES TO PHQ QUESTIONS 1-9: 10

## 2022-07-18 ASSESSMENT — ENCOUNTER SYMPTOMS
CONSTIPATION: 0
ABDOMINAL PAIN: 0
DIARRHEA: 0

## 2022-07-20 LAB — URINE CULTURE, ROUTINE: NORMAL

## 2022-08-17 ENCOUNTER — TELEPHONE (OUTPATIENT)
Dept: FAMILY MEDICINE CLINIC | Age: 47
End: 2022-08-17

## 2022-08-17 ENCOUNTER — TELEMEDICINE (OUTPATIENT)
Dept: PRIMARY CARE CLINIC | Age: 47
End: 2022-08-17
Payer: MEDICARE

## 2022-08-17 ENCOUNTER — NURSE ONLY (OUTPATIENT)
Dept: FAMILY MEDICINE CLINIC | Age: 47
End: 2022-08-17
Payer: MEDICARE

## 2022-08-17 DIAGNOSIS — N39.0 RECURRENT UTI: ICD-10-CM

## 2022-08-17 DIAGNOSIS — N39.0 URINARY TRACT INFECTION WITHOUT HEMATURIA, SITE UNSPECIFIED: Primary | ICD-10-CM

## 2022-08-17 LAB
BILIRUBIN, POC: NORMAL
BLOOD URINE, POC: NORMAL
CLARITY, POC: NORMAL
COLOR, POC: NORMAL
GLUCOSE URINE, POC: NORMAL
KETONES, POC: NORMAL
LEUKOCYTE EST, POC: NORMAL
NITRITE, POC: POSITIVE
PH, POC: 7
PROTEIN, POC: NORMAL
SPECIFIC GRAVITY, POC: 1.02
UROBILINOGEN, POC: 1

## 2022-08-17 PROCEDURE — 99212 OFFICE O/P EST SF 10 MIN: CPT | Performed by: NURSE PRACTITIONER

## 2022-08-17 PROCEDURE — 81002 URINALYSIS NONAUTO W/O SCOPE: CPT | Performed by: NURSE PRACTITIONER

## 2022-08-17 RX ORDER — NITROFURANTOIN 25; 75 MG/1; MG/1
100 CAPSULE ORAL 2 TIMES DAILY
Qty: 20 CAPSULE | Refills: 0 | Status: SHIPPED | OUTPATIENT
Start: 2022-08-17 | End: 2022-08-27

## 2022-08-17 RX ORDER — PHENAZOPYRIDINE HYDROCHLORIDE 200 MG/1
200 TABLET, FILM COATED ORAL 3 TIMES DAILY PRN
Qty: 15 TABLET | Refills: 0 | Status: SHIPPED | OUTPATIENT
Start: 2022-08-17 | End: 2022-08-22

## 2022-08-17 NOTE — TELEPHONE ENCOUNTER
Message/Telephone call regarding - New or worsening symptoms      Symptoms reported - Neph/Urology - frequency, foul smelling urine , and urgency  Pain Scale - N/A     Symptom Onset Date - 8/14/2022  Onset - with a sudden onset    Aggravating factors - everything   Relieving actions and treatment(s) attempted - None    Last Appointment at [facility] - 7/18/2022  Next Appointment - [email or web address]      Is there any other information to share with PCP -  no    REFRESH after scheduling appointment.     Future Appointments   Date Time Provider Rajeev García   8/17/2022  3:30 PM SCHEDULE, GREGORIO ZACARIAS [place] - [facility]   8/17/2022  4:30 PM [facility]

## 2022-08-17 NOTE — PROGRESS NOTES
[id number] [facility] (:  1975) is a Established patient, here for evaluation of the following:    ASSESSMENT/PLAN:  Below is the assessment and plan developed based on review of pertinent history, physical exam, labs, studies, and medications. 1. Urinary tract infection without hematuria, site unspecified  If no improvement/or worsening of condition, notify office for further follow up. Drink plenty of fluids. (Limit caffeine, spicy foods, and alcohol). Take medication as prescribed. You make drink cranberry juice. Take probiotics as directed. May take Tylenol for fever. Make sure to wipe from front to back. Practice good hand hygiene. Empty bladder as soon as you have the urge to do so. -     POCT Urinalysis no Micro; Future  -     Culture, Urine  -     nitrofurantoin, macrocrystal-monohydrate, (MACROBID) 100 MG capsule; Take 1 capsule by mouth 2 times daily for 10 days, Disp-20 capsule, R-0Normal  -     phenazopyridine (PYRIDIUM) 200 MG tablet; Take 1 tablet by mouth 3 times daily as needed for Pain, Disp-15 tablet, R-0Normal    No follow-ups on file. SUBJECTIVE/OBJECTIVE:  Saturday she started having symptoms. She was hoping they would resolve, but it did not. She is having frequency, urgency,burning with  urination, lower abdomen pain- bladder spasms, cloudy and malodorous. urine. She denies fever or chills. She used to see urologist. Vijaya Robles patient to discuss with her PCP and call her if symptoms persist.    Review of Systems   Genitourinary:  Positive for dysuria, frequency and urgency. Suprapubic pain, cloudy and malodorous urine. No flowsheet data found.       Physical Exam    [INSTRUCTIONS:  \"[x]\" Indicates a positive item  \"[]\" Indicates a negative item  -- DELETE ALL ITEMS NOT EXAMINED]    Constitutional: [x] Appears well-developed and well-nourished [x] No apparent distress      [] Abnormal -     Mental status: [x] Alert and awake  [x] Oriented to person/place/time [x] Able to follow commands    [] Abnormal -     Eyes:   EOM    [x]  Normal    [] Abnormal -   Sclera  [x]  Normal    [] Abnormal -          Discharge [x]  None visible   [] Abnormal -     HENT: [x] Normocephalic, atraumatic  [] Abnormal -   [x] Mouth/Throat: Mucous membranes are moist    External Ears [x] Normal  [] Abnormal -    Neck: [x] No visualized mass [] Abnormal -     Pulmonary/Chest: [x] Respiratory effort normal   [x] No visualized signs of difficulty breathing or respiratory distress        [] Abnormal -      Musculoskeletal:   [x] Normal gait with no signs of ataxia         [x] Normal range of motion of neck        [] Abnormal -     Neurological:        [x] No Facial Asymmetry (Cranial nerve 7 motor function) (limited exam due to video visit)          [x] No gaze palsy        [] Abnormal -          Skin:        [x] No significant exanthematous lesions or discoloration noted on facial skin         [] Abnormal -            Psychiatric:       [x] Normal Affect [] Abnormal -            Other pertinent observable physical exam findings:-      On this date 8/17/2022 I have spent 15 minutes reviewing previous notes, test results and face to face (virtual) with the patient discussing the diagnosis and importance of compliance with the treatment plan as well as documenting on the day of the visitXin Maier, was evaluated through a synchronous (real-time) audio-video encounter. The patient (or guardian if applicable) is aware that this is a billable service, which includes applicable co-pays. This Virtual Visit was conducted with patient's (and/or legal guardian's) consent. The visit was conducted pursuant to the emergency declaration under the [address] [place] [address] authority and the [employer] General Act. Patient identification was verified, and a caregiver was present when appropriate.  The patient was located at home in a state where the provider was licensed to provide care.     --PRATEEK Del Angel

## 2022-08-17 NOTE — LETTER
I had the pleasure of seeing Alisa Min today for a primary care virtualist video visit secondary to UTI. I have provided the following recommendations: macrobid. I have included my note for your review and have asked the patient to follow up with you if not improving. If you have questions, please reach out via "[employer] secure messaging by searching for the group [id number]. Your communication will be answered promptly by the Virtualist on service for the day. Additionally, we would love your overall feedback on this visit. Please hit shift and click the following link to let us know if the Virtualist service met your expectations. [email or web address]      Electronically signed by PRATEEK Conroy on 8/17/22 at 4:51 PM EDT.

## 2022-08-19 LAB — URINE CULTURE, ROUTINE: NORMAL

## 2022-10-01 LAB — NONINV COLON CA DNA+OCC BLD SCRN STL QL: NORMAL

## 2024-03-05 ENCOUNTER — TELEMEDICINE (OUTPATIENT)
Dept: FAMILY MEDICINE CLINIC | Age: 49
End: 2024-03-05

## 2024-03-05 DIAGNOSIS — H69.93 DYSFUNCTION OF BOTH EUSTACHIAN TUBES: ICD-10-CM

## 2024-03-05 DIAGNOSIS — J01.90 ACUTE BACTERIAL SINUSITIS: Primary | ICD-10-CM

## 2024-03-05 DIAGNOSIS — B96.89 ACUTE BACTERIAL SINUSITIS: Primary | ICD-10-CM

## 2024-03-05 PROCEDURE — 99213 OFFICE O/P EST LOW 20 MIN: CPT | Performed by: PHYSICIAN ASSISTANT

## 2024-03-05 RX ORDER — AMOXICILLIN AND CLAVULANATE POTASSIUM 400; 57 MG/5ML; MG/5ML
875 POWDER, FOR SUSPENSION ORAL 2 TIMES DAILY
Qty: 218 ML | Refills: 0 | Status: SHIPPED | OUTPATIENT
Start: 2024-03-05 | End: 2024-03-15

## 2024-03-05 RX ORDER — TROSPIUM CHLORIDE 20 MG/1
20 TABLET, FILM COATED ORAL
COMMUNITY
Start: 2024-02-07

## 2024-03-05 RX ORDER — FLUTICASONE PROPIONATE 50 MCG
1 SPRAY, SUSPENSION (ML) NASAL DAILY
Qty: 16 G | Refills: 0 | Status: SHIPPED | OUTPATIENT
Start: 2024-03-05

## 2024-03-05 RX ORDER — VALACYCLOVIR HYDROCHLORIDE 1 G/1
TABLET, FILM COATED ORAL
COMMUNITY
Start: 2021-06-17

## 2024-03-05 RX ORDER — AMOXICILLIN AND CLAVULANATE POTASSIUM 875; 125 MG/1; MG/1
1 TABLET, FILM COATED ORAL 2 TIMES DAILY
Qty: 20 TABLET | Refills: 0 | Status: SHIPPED | OUTPATIENT
Start: 2024-03-05 | End: 2024-03-05

## 2024-03-05 RX ORDER — HYDROXYZINE PAMOATE 25 MG/1
50 CAPSULE ORAL EVERY 6 HOURS PRN
COMMUNITY
Start: 2021-06-21

## 2024-03-05 RX ORDER — CLONIDINE HYDROCHLORIDE 0.1 MG/1
0.1 TABLET ORAL 2 TIMES DAILY
COMMUNITY

## 2024-03-05 SDOH — ECONOMIC STABILITY: FOOD INSECURITY: WITHIN THE PAST 12 MONTHS, YOU WORRIED THAT YOUR FOOD WOULD RUN OUT BEFORE YOU GOT MONEY TO BUY MORE.: SOMETIMES TRUE

## 2024-03-05 SDOH — ECONOMIC STABILITY: FOOD INSECURITY: WITHIN THE PAST 12 MONTHS, THE FOOD YOU BOUGHT JUST DIDN'T LAST AND YOU DIDN'T HAVE MONEY TO GET MORE.: SOMETIMES TRUE

## 2024-03-05 SDOH — ECONOMIC STABILITY: INCOME INSECURITY: HOW HARD IS IT FOR YOU TO PAY FOR THE VERY BASICS LIKE FOOD, HOUSING, MEDICAL CARE, AND HEATING?: SOMEWHAT HARD

## 2024-03-05 SDOH — ECONOMIC STABILITY: HOUSING INSECURITY
IN THE LAST 12 MONTHS, WAS THERE A TIME WHEN YOU DID NOT HAVE A STEADY PLACE TO SLEEP OR SLEPT IN A SHELTER (INCLUDING NOW)?: NO

## 2024-03-05 ASSESSMENT — ANXIETY QUESTIONNAIRES
2. NOT BEING ABLE TO STOP OR CONTROL WORRYING: 1
7. FEELING AFRAID AS IF SOMETHING AWFUL MIGHT HAPPEN: 1
6. BECOMING EASILY ANNOYED OR IRRITABLE: 1
1. FEELING NERVOUS, ANXIOUS, OR ON EDGE: 1
4. TROUBLE RELAXING: 1
IF YOU CHECKED OFF ANY PROBLEMS ON THIS QUESTIONNAIRE, HOW DIFFICULT HAVE THESE PROBLEMS MADE IT FOR YOU TO DO YOUR WORK, TAKE CARE OF THINGS AT HOME, OR GET ALONG WITH OTHER PEOPLE: SOMEWHAT DIFFICULT
GAD7 TOTAL SCORE: 6
3. WORRYING TOO MUCH ABOUT DIFFERENT THINGS: 0
5. BEING SO RESTLESS THAT IT IS HARD TO SIT STILL: 1

## 2024-03-05 ASSESSMENT — ENCOUNTER SYMPTOMS
NAUSEA: 0
SINUS PRESSURE: 1
COUGH: 1
VOMITING: 0
DIARRHEA: 1

## 2024-03-05 ASSESSMENT — PATIENT HEALTH QUESTIONNAIRE - PHQ9
4. FEELING TIRED OR HAVING LITTLE ENERGY: 1
SUM OF ALL RESPONSES TO PHQ9 QUESTIONS 1 & 2: 2
9. THOUGHTS THAT YOU WOULD BE BETTER OFF DEAD, OR OF HURTING YOURSELF: 0
8. MOVING OR SPEAKING SO SLOWLY THAT OTHER PEOPLE COULD HAVE NOTICED. OR THE OPPOSITE, BEING SO FIGETY OR RESTLESS THAT YOU HAVE BEEN MOVING AROUND A LOT MORE THAN USUAL: 0
SUM OF ALL RESPONSES TO PHQ QUESTIONS 1-9: 6
5. POOR APPETITE OR OVEREATING: 0
SUM OF ALL RESPONSES TO PHQ QUESTIONS 1-9: 6
SUM OF ALL RESPONSES TO PHQ QUESTIONS 1-9: 6
10. IF YOU CHECKED OFF ANY PROBLEMS, HOW DIFFICULT HAVE THESE PROBLEMS MADE IT FOR YOU TO DO YOUR WORK, TAKE CARE OF THINGS AT HOME, OR GET ALONG WITH OTHER PEOPLE: 1
7. TROUBLE CONCENTRATING ON THINGS, SUCH AS READING THE NEWSPAPER OR WATCHING TELEVISION: 1
1. LITTLE INTEREST OR PLEASURE IN DOING THINGS: 1
6. FEELING BAD ABOUT YOURSELF - OR THAT YOU ARE A FAILURE OR HAVE LET YOURSELF OR YOUR FAMILY DOWN: 2
SUM OF ALL RESPONSES TO PHQ QUESTIONS 1-9: 6
2. FEELING DOWN, DEPRESSED OR HOPELESS: 1
3. TROUBLE FALLING OR STAYING ASLEEP: 0

## 2024-03-05 NOTE — PROGRESS NOTES
Car Landa, was evaluated through a synchronous (real-time) audio-video encounter. The patient (or guardian if applicable) is aware that this is a billable service, which includes applicable co-pays. This Virtual Visit was conducted with patient's (and/or legal guardian's) consent. Patient identification was verified, and a caregiver was present when appropriate.   The patient was located at Home: [address] [place] [id number]  Provider was located at Facility (Appt Dept): [address]  [address]  [place] [address]      Car Landa (:  1975) is a Established patient, presenting virtually for evaluation of the following:    Assessment & Plan   Below is the assessment and plan developed based on review of pertinent history, physical exam, labs, studies, and medications.  1. Acute bacterial sinusitis  -     amoxicillin-clavulanate (AUGMENTIN) 400-57 MG/5ML suspension; Take 10.9 mLs by mouth 2 times daily for 10 days, Disp-218 mL, R-0Normal  2. Dysfunction of both eustachian tubes  -     fluticasone (FLONASE) 50 MCG/ACT nasal spray; 1 spray by Each Nostril route daily, Disp-16 g, R-0Normal  -     suspect this is the cause of dizziness, if no improvement in 7 days please call and make an appointment to be seen    Return for physical exam.       Subjective   HPI  Pt is here for evaluation of uri  Timing: two weeks, worse in the last five days  Please see ROS  Tx: liquid tylenol, aspirin  Weight: 207 lbs  Sick contacts: coworkers have illness    Review of Systems   Constitutional:  Positive for chills. Negative for diaphoresis and fever.   HENT:  Positive for congestion and sinus pressure.         Sore tongue, ear pressure and fluid   Respiratory:  Positive for cough.    Cardiovascular:  Positive for chest pain.   Gastrointestinal:  Positive for diarrhea. Negative for nausea and vomiting.   Musculoskeletal:  Negative for myalgias.   Neurological:  Positive for dizziness,

## 2024-03-15 ENCOUNTER — HOSPITAL ENCOUNTER (OUTPATIENT)
Dept: WOMENS IMAGING | Age: 49
Discharge: HOME | End: 2024-03-15
Payer: MEDICAID

## 2024-03-15 ENCOUNTER — OFFICE VISIT (OUTPATIENT)
Dept: FAMILY MEDICINE CLINIC | Age: 49
End: 2024-03-15

## 2024-03-15 VITALS
WEIGHT: 196 LBS | OXYGEN SATURATION: 97 % | BODY MASS INDEX: 31.64 KG/M2 | HEART RATE: 83 BPM | DIASTOLIC BLOOD PRESSURE: 80 MMHG | SYSTOLIC BLOOD PRESSURE: 128 MMHG

## 2024-03-15 VITALS — WEIGHT: 216 LBS | BODY MASS INDEX: 34.72 KG/M2 | HEIGHT: 66 IN

## 2024-03-15 DIAGNOSIS — Z01.419 GYNECOLOGIC EXAM NORMAL: ICD-10-CM

## 2024-03-15 DIAGNOSIS — Z00.00 PHYSICAL EXAM, ANNUAL: ICD-10-CM

## 2024-03-15 DIAGNOSIS — E55.9 VITAMIN D DEFICIENCY: ICD-10-CM

## 2024-03-15 DIAGNOSIS — E53.8 VITAMIN B12 DEFICIENCY: ICD-10-CM

## 2024-03-15 DIAGNOSIS — E61.1 IRON DEFICIENCY: ICD-10-CM

## 2024-03-15 DIAGNOSIS — Z12.31 ENCOUNTER FOR SCREENING MAMMOGRAM FOR MALIGNANT NEOPLASM OF BREAST: ICD-10-CM

## 2024-03-15 DIAGNOSIS — Z00.00 PHYSICAL EXAM, ANNUAL: Primary | ICD-10-CM

## 2024-03-15 LAB
25(OH)D3 SERPL-MCNC: 29 NG/ML
ALBUMIN SERPL-MCNC: 4.4 G/DL (ref 3.4–5)
ALBUMIN/GLOB SERPL: 1.8 {RATIO} (ref 1.1–2.2)
ALP SERPL-CCNC: 81 U/L (ref 40–129)
ALT SERPL-CCNC: 10 U/L (ref 10–40)
ANION GAP SERPL CALCULATED.3IONS-SCNC: 10 MMOL/L (ref 3–16)
AST SERPL-CCNC: 16 U/L (ref 15–37)
BILIRUB SERPL-MCNC: 0.8 MG/DL (ref 0–1)
BUN SERPL-MCNC: 17 MG/DL (ref 7–20)
CALCIUM SERPL-MCNC: 9.8 MG/DL (ref 8.3–10.6)
CHLORIDE SERPL-SCNC: 101 MMOL/L (ref 99–110)
CHOLEST SERPL-MCNC: 186 MG/DL (ref 0–199)
CO2 SERPL-SCNC: 29 MMOL/L (ref 21–32)
CREAT SERPL-MCNC: 0.7 MG/DL (ref 0.6–1.1)
DEPRECATED RDW RBC AUTO: 13.6 % (ref 12.4–15.4)
FOLATE SERPL-MCNC: 14.55 NG/ML (ref 4.78–24.2)
GFR SERPLBLD CREATININE-BSD FMLA CKD-EPI: >60 ML/MIN/{1.73_M2}
GLUCOSE SERPL-MCNC: 91 MG/DL (ref 70–99)
HBV CORE IGM SERPL QL IA: NORMAL
HBV SURFACE AG SERPL QL IA: NORMAL
HCT VFR BLD AUTO: 42.8 % (ref 36–48)
HDLC SERPL-MCNC: 58 MG/DL (ref 40–60)
HGB BLD-MCNC: 14.6 G/DL (ref 12–16)
IRON SATN MFR SERPL: 31 % (ref 15–50)
IRON SERPL-MCNC: 99 UG/DL (ref 37–145)
LDLC SERPL CALC-MCNC: 114 MG/DL
MCH RBC QN AUTO: 30.7 PG (ref 26–34)
MCHC RBC AUTO-ENTMCNC: 34 G/DL (ref 31–36)
MCV RBC AUTO: 90.3 FL (ref 80–100)
PLATELET # BLD AUTO: 351 K/UL (ref 135–450)
PMV BLD AUTO: 7.7 FL (ref 5–10.5)
POTASSIUM SERPL-SCNC: 4.5 MMOL/L (ref 3.5–5.1)
PROT SERPL-MCNC: 6.8 G/DL (ref 6.4–8.2)
RBC # BLD AUTO: 4.74 M/UL (ref 4–5.2)
SODIUM SERPL-SCNC: 140 MMOL/L (ref 136–145)
TIBC SERPL-MCNC: 319 UG/DL (ref 260–445)
TRIGL SERPL-MCNC: 70 MG/DL (ref 0–150)
VIT B12 SERPL-MCNC: 721 PG/ML (ref 211–911)
VLDLC SERPL CALC-MCNC: 14 MG/DL
WBC # BLD AUTO: 7.8 K/UL (ref 4–11)

## 2024-03-15 PROCEDURE — 77063 BREAST TOMOSYNTHESIS BI: CPT

## 2024-03-15 ASSESSMENT — PATIENT HEALTH QUESTIONNAIRE - PHQ9
4. FEELING TIRED OR HAVING LITTLE ENERGY: 2
SUM OF ALL RESPONSES TO PHQ QUESTIONS 1-9: 16
SUM OF ALL RESPONSES TO PHQ QUESTIONS 1-9: 16
9. THOUGHTS THAT YOU WOULD BE BETTER OFF DEAD, OR OF HURTING YOURSELF: 0
5. POOR APPETITE OR OVEREATING: 2
SUM OF ALL RESPONSES TO PHQ9 QUESTIONS 1 & 2: 4
2. FEELING DOWN, DEPRESSED OR HOPELESS: 2
6. FEELING BAD ABOUT YOURSELF - OR THAT YOU ARE A FAILURE OR HAVE LET YOURSELF OR YOUR FAMILY DOWN: 2
8. MOVING OR SPEAKING SO SLOWLY THAT OTHER PEOPLE COULD HAVE NOTICED. OR THE OPPOSITE, BEING SO FIGETY OR RESTLESS THAT YOU HAVE BEEN MOVING AROUND A LOT MORE THAN USUAL: 2
3. TROUBLE FALLING OR STAYING ASLEEP: 2
7. TROUBLE CONCENTRATING ON THINGS, SUCH AS READING THE NEWSPAPER OR WATCHING TELEVISION: 2
10. IF YOU CHECKED OFF ANY PROBLEMS, HOW DIFFICULT HAVE THESE PROBLEMS MADE IT FOR YOU TO DO YOUR WORK, TAKE CARE OF THINGS AT HOME, OR GET ALONG WITH OTHER PEOPLE: 1
SUM OF ALL RESPONSES TO PHQ QUESTIONS 1-9: 16
SUM OF ALL RESPONSES TO PHQ QUESTIONS 1-9: 16
1. LITTLE INTEREST OR PLEASURE IN DOING THINGS: 2

## 2024-03-15 ASSESSMENT — ANXIETY QUESTIONNAIRES
7. FEELING AFRAID AS IF SOMETHING AWFUL MIGHT HAPPEN: 2
1. FEELING NERVOUS, ANXIOUS, OR ON EDGE: 2
GAD7 TOTAL SCORE: 14
IF YOU CHECKED OFF ANY PROBLEMS ON THIS QUESTIONNAIRE, HOW DIFFICULT HAVE THESE PROBLEMS MADE IT FOR YOU TO DO YOUR WORK, TAKE CARE OF THINGS AT HOME, OR GET ALONG WITH OTHER PEOPLE: SOMEWHAT DIFFICULT
4. TROUBLE RELAXING: 2
2. NOT BEING ABLE TO STOP OR CONTROL WORRYING: 2
3. WORRYING TOO MUCH ABOUT DIFFERENT THINGS: 2
6. BECOMING EASILY ANNOYED OR IRRITABLE: 2
5. BEING SO RESTLESS THAT IT IS HARD TO SIT STILL: 2

## 2024-03-15 ASSESSMENT — ENCOUNTER SYMPTOMS
COLOR CHANGE: 0
VOMITING: 0
SHORTNESS OF BREATH: 0
NAUSEA: 0
COUGH: 0
ABDOMINAL PAIN: 0
CHEST TIGHTNESS: 0
DIARRHEA: 0

## 2024-03-15 NOTE — PROGRESS NOTES
3/15/2024    Car Landa (:  1975) is a 48 y.o. female, here for a preventive medicine evaluation.    Patient Active Problem List   Diagnosis    Affective psychosis, bipolar (HCC)    Histrionic personality disorder (HCC)    History of bariatric surgery    Chronic right shoulder pain    Vitamin D deficiency    Vitamin B12 deficiency     Diet: two meals per day, vegetables once per day, drinking water and gatorade and green tea and one coffee per day  Exercise: walks for work  Vision: no change to vision, due for an appointment  Dental: due for appointment  Not currently sexually active  Last pap was 2 years    Hearing liquid in both her ears      Review of Systems   Constitutional:  Negative for diaphoresis, fatigue and unexpected weight change.   HENT:          Fluid in ears   Eyes:  Negative for visual disturbance.   Respiratory:  Negative for cough, chest tightness and shortness of breath.    Cardiovascular:  Negative for chest pain, palpitations and leg swelling.   Gastrointestinal:  Negative for abdominal pain, diarrhea, nausea and vomiting.   Endocrine: Negative for polydipsia, polyphagia and polyuria.   Genitourinary:  Negative for difficulty urinating, hematuria, menstrual problem, pelvic pain, vaginal bleeding, vaginal discharge and vaginal pain.   Musculoskeletal:  Negative for arthralgias.   Skin:  Negative for color change.   Neurological:  Negative for dizziness, light-headedness and headaches.   Hematological:  Negative for adenopathy.       Prior to Visit Medications    Medication Sig Taking? Authorizing Provider   cloNIDine (CATAPRES) 0.1 MG tablet Take 1 tablet by mouth 2 times daily Yes Yohana Rivers MD   trospium (SANCTURA) 20 MG tablet Take 1 tablet by mouth 2 times daily (before meals) Yes Yohana Rivers MD   valACYclovir (VALTREX) 1 g tablet 2 tabs first sign of infection-repeat in 12 hours Yes ProviderYohana MD   fluticasone (FLONASE) 50 MCG/ACT

## 2024-03-16 LAB — HBV SURFACE AB SERPL IA-ACNC: 342.6 MIU/ML

## 2024-03-20 LAB
HPV HR 12 DNA SPEC QL NAA+PROBE: NOT DETECTED
HPV16 DNA SPEC QL NAA+PROBE: NOT DETECTED
HPV16+18+H RISK 12 DNA SPEC-IMP: NORMAL
HPV18 DNA SPEC QL NAA+PROBE: NOT DETECTED

## 2024-03-25 RX ORDER — VALACYCLOVIR HYDROCHLORIDE 1 G/1
TABLET, FILM COATED ORAL
Qty: 4 TABLET | Refills: 3 | Status: SHIPPED | OUTPATIENT
Start: 2024-03-25

## 2025-03-07 ENCOUNTER — TELEPHONE (OUTPATIENT)
Dept: FAMILY MEDICINE CLINIC | Age: 50
End: 2025-03-07